# Patient Record
Sex: FEMALE | Race: WHITE | URBAN - NONMETROPOLITAN AREA
[De-identification: names, ages, dates, MRNs, and addresses within clinical notes are randomized per-mention and may not be internally consistent; named-entity substitution may affect disease eponyms.]

---

## 2017-01-27 ENCOUNTER — APPOINTMENT (RX ONLY)
Dept: URBAN - NONMETROPOLITAN AREA CLINIC 1 | Facility: CLINIC | Age: 67
Setting detail: DERMATOLOGY
End: 2017-01-27

## 2017-01-27 DIAGNOSIS — Z41.9 ENCOUNTER FOR PROCEDURE FOR PURPOSES OTHER THAN REMEDYING HEALTH STATE, UNSPECIFIED: ICD-10-CM

## 2017-01-27 DIAGNOSIS — L81.4 OTHER MELANIN HYPERPIGMENTATION: ICD-10-CM

## 2017-01-27 PROCEDURE — ? FILLERS

## 2017-01-27 PROCEDURE — ? BOTOX

## 2017-01-27 PROCEDURE — ? BENIGN DESTRUCTION COSMETIC MULTI

## 2017-01-27 ASSESSMENT — LOCATION SIMPLE DESCRIPTION DERM: LOCATION SIMPLE: LEFT HAND

## 2017-01-27 ASSESSMENT — LOCATION DETAILED DESCRIPTION DERM: LOCATION DETAILED: LEFT RADIAL DORSAL HAND

## 2017-01-27 ASSESSMENT — LOCATION ZONE DERM: LOCATION ZONE: HAND

## 2017-01-27 NOTE — PROCEDURE: BENIGN DESTRUCTION COSMETIC MULTI
Post-Care Instructions: I reviewed with the patient in detail post-care instructions. Patient is to wear sunprotection, and avoid picking at any of the treated lesions. Pt may apply Vaseline to crusted or scabbing areas.
Anesthesia Volume In Cc: 0
Detail Level: Simple
Total Number Of Lesions Treated: 5
Consent: The patient's verbal consent was obtained including but not limited to risks of crusting, scabbing, blistering, scarring, darker or lighter pigmentary change, recurrence, incomplete removal and infection.  Pre-treatment area cleansed with alcohol and hibiclens.

## 2017-01-27 NOTE — PROCEDURE: FILLERS
Marionette Lines Filler Volume In Cc: 0
Additional Area 2 Location: oral comissures
Additional Anesthesia Volume In Cc: 6
Price (Use Numbers Only, No Special Characters Or $): 1100
Expiration Date (Month Year): 10/31/2017
Additional Area 1 Location: Perioral rhytids
Filler: Juvederm Ultra XC
Lot #: [id number]
Post-Care Instructions: Patient instructed to apply ice to reduce swelling.
Additional Area 4 Location: Oral commissures
Topical Anesthesia?: 5% lidocaine
Map Statment: See Attach Map for Complete Details
Additional Area 2 Location: cheeks smile lines
Lot #: [id number]
Expiration Date (Month Year): 03/21/2018
Additional Area 1 Volume In Cc: 0.5
Additional Area 3 Location: Chin
Consent: Written consent obtained. Risks include but not limited to bruising, beading, irregular texture, ulceration, infection, allergic reaction, scar formation, incomplete augmentation, temporary nature, procedural pain.
Use Map Statement For Sites (Optional): No
Additional Area 1 Volume In Cc: 1
Additional Area 3 Location: Glabella
Detail Level: Zone
Additional Area 5 Location: L upper lip rhytid

## 2017-01-27 NOTE — PROCEDURE: BOTOX
Levator Labii Superioris Units: 0
Consent: Written consent obtained. Risks include but not limited to lid/brow ptosis, bruising, swelling, diplopia, temporary effect, incomplete chemical denervation.
Dilution (U/0.1 Cc): 3
Glabellar Complex Units: 9
Additional Area 4 Location: Gummy smile
Expiration Date (Month Year): 8/2019
Additional Area 3 Location: Infraorbital
Additional Area 2 Location: Bunny lines
Additional Area 6 Location: arch of eyebrows
Additional Area 5 Location: Upper lip
Detail Level: Zone
Post-Care Instructions: Patient instructed to not lie down for 4 hours and limit physical activity for 24 hours.
Price (Use Numbers Only, No Special Characters Or $): 361
Lot #: [id number]
Additional Area 1 Location: Crows feet

## 2017-04-13 ENCOUNTER — OFFICE VISIT (OUTPATIENT)
Dept: RHEUMATOLOGY | Facility: PHYSICIAN GROUP | Age: 67
End: 2017-04-13
Payer: MEDICARE

## 2017-04-13 VITALS
DIASTOLIC BLOOD PRESSURE: 78 MMHG | HEART RATE: 90 BPM | BODY MASS INDEX: 18.58 KG/M2 | RESPIRATION RATE: 12 BRPM | HEIGHT: 62 IN | WEIGHT: 101 LBS | TEMPERATURE: 98.6 F | SYSTOLIC BLOOD PRESSURE: 112 MMHG | OXYGEN SATURATION: 98 %

## 2017-04-13 DIAGNOSIS — M19.90 OSTEOARTHRITIS, UNSPECIFIED OSTEOARTHRITIS TYPE, UNSPECIFIED SITE: ICD-10-CM

## 2017-04-13 DIAGNOSIS — I73.00 RAYNAUD'S DISEASE WITHOUT GANGRENE: ICD-10-CM

## 2017-04-13 PROCEDURE — 3017F COLORECTAL CA SCREEN DOC REV: CPT | Mod: 8P | Performed by: INTERNAL MEDICINE

## 2017-04-13 PROCEDURE — 4004F PT TOBACCO SCREEN RCVD TLK: CPT | Mod: 8P | Performed by: INTERNAL MEDICINE

## 2017-04-13 PROCEDURE — G8419 CALC BMI OUT NRM PARAM NOF/U: HCPCS | Performed by: INTERNAL MEDICINE

## 2017-04-13 PROCEDURE — 1101F PT FALLS ASSESS-DOCD LE1/YR: CPT | Mod: 8P | Performed by: INTERNAL MEDICINE

## 2017-04-13 PROCEDURE — 4040F PNEUMOC VAC/ADMIN/RCVD: CPT | Mod: 8P | Performed by: INTERNAL MEDICINE

## 2017-04-13 PROCEDURE — G8432 DEP SCR NOT DOC, RNG: HCPCS | Performed by: INTERNAL MEDICINE

## 2017-04-13 PROCEDURE — 3014F SCREEN MAMMO DOC REV: CPT | Mod: 8P | Performed by: INTERNAL MEDICINE

## 2017-04-13 PROCEDURE — 99204 OFFICE O/P NEW MOD 45 MIN: CPT | Performed by: INTERNAL MEDICINE

## 2017-04-13 RX ORDER — TRAZODONE HYDROCHLORIDE 100 MG/1
100 TABLET ORAL NIGHTLY
COMMUNITY

## 2017-04-13 RX ORDER — IBUPROFEN 200 MG
200 TABLET ORAL EVERY 6 HOURS PRN
COMMUNITY
End: 2020-05-22

## 2017-04-13 RX ORDER — METHYLPHENIDATE HYDROCHLORIDE 20 MG/1
20 TABLET ORAL 3 TIMES DAILY
COMMUNITY

## 2017-04-13 RX ORDER — FLUOXETINE HYDROCHLORIDE 40 MG/1
40 CAPSULE ORAL EVERY MORNING
COMMUNITY

## 2017-04-13 RX ORDER — ALPRAZOLAM 0.5 MG/1
0.5 TABLET, EXTENDED RELEASE ORAL
Status: ON HOLD | COMMUNITY
End: 2020-05-26

## 2017-04-13 RX ORDER — LURASIDONE HYDROCHLORIDE 60 MG/1
1 TABLET, FILM COATED ORAL EVERY MORNING
COMMUNITY

## 2017-04-13 NOTE — MR AVS SNAPSHOT
"        Angie Dover   2017 7:30 AM   Office Visit   MRN: [id number]    Department:  Rheumatology [facility]   Dept Phone:  [phone]    Description:  Female : 1950   Provider:  Carmen Rodriguez M.D.           Reason for Visit     New Patient new patient       Allergies as of 2017     Allergen Noted Reactions    Morphine 2017   Unspecified    Pt. States it gives her Migraine's     Penicillins 2017   Swelling    Pt. States swelling of the mouth     Sulfa Drugs 2017   Swelling    Pt. States mouth swelling       You were diagnosed with     Raynaud's disease without gangrene   [[id number]]       Osteoarthritis, unspecified osteoarthritis type, unspecified site   [[id number]]         Vital Signs     Blood Pressure Pulse Temperature Respirations Height Weight    112/78 mmHg 90 37 °C (98.6 °F) 12 1.575 m (5' 2.01\") 45.813 kg (101 lb)    Body Mass Index Oxygen Saturation Breastfeeding?             18.47 kg/m2 98% No         Basic Information     Date Of Birth Sex Race Ethnicity Preferred Language    1950 Female Unable to Obtain Unknown English      Your appointments     2017  2:00 PM   Follow Up Visit with Carmen Rodriguez M.D.   [facility] (--)    [address] [place] [address], [address]  [facility] [address] [phone]           You will be receiving a confirmation call a few days before your appointment from our automated call confirmation system.              Health Maintenance     Patient has no pending health maintenance at this time      Current Immunizations     No immunizations on file.      Below and/or attached are the medications your provider expects you to take. Review all of your home medications and newly ordered medications with your provider and/or pharmacist. Follow medication instructions as directed by your provider and/or pharmacist. Please keep your medication list with you and share with your provider. Update the information when medications are discontinued, doses " are changed, or new medications (including over-the-counter products) are added; and carry medication information at all times in the event of emergency situations     Allergies:  MORPHINE - Unspecified     PENICILLINS - Swelling     SULFA DRUGS - Swelling               Medications  Valid as of: April 13, 2017 -  8:24 AM    Generic Name Brand Name Tablet Size Instructions for use    ALPRAZolam (TABLET SR 24 HR) XANAX XR 0.5 MG Take 0.5 mg by mouth every morning.        Cholecalciferol (Cap) vitamin D3 5000 UNITS Take 1 Cap by mouth every day.        FLUoxetine HCl (Cap) PROZAC 40 MG Take 40 mg by mouth every day.        Folic Acid   Take  by mouth.        Ibuprofen (Tab) MOTRIN 200 MG Take 200 mg by mouth every 6 hours as needed.        Lurasidone HCl (Tab) Lurasidone HCl 60 MG Take  by mouth.        Methylphenidate HCl (Tab) RITALIN 20 MG Take 20 mg by mouth 2 times a day.        TraZODone HCl (Tab) DESYREL 100 MG Take 100 mg by mouth every evening.        .                 Medicines prescribed today were sent to:     None      Medication refill instructions:       If your prescription bottle indicates you have medication refills left, it is not necessary to call your provider’s office. Please contact your pharmacy and they will refill your medication.    If your prescription bottle indicates you do not have any refills left, you may request refills at any time through one of the following ways: The online [employer] system (except Urgent Care), by calling your provider’s office, or by asking your pharmacy to contact your provider’s office with a refill request. Medication refills are processed only during regular business hours and may not be available until the next business day. Your provider may request additional information or to have a follow-up visit with you prior to refilling your medication.   *Please Note: Medication refills are assigned a new Rx number when refilled electronically. Your pharmacy may  indicate that no refills were authorized even though a new prescription for the same medication is available at the pharmacy. Please request the medicine by name with the pharmacy before contacting your provider for a refill.        Your To Do List     Future Labs/Procedures Complete By Expires    JORDON TITER  As directed 4/13/2018    COMP METABOLIC PANEL  As directed 4/13/2018    DX-JOINT SURVEY-FEET SINGLE VIEW  As directed 4/13/2018    DX-JOINT SURVEY-HANDS SINGLE VIEW  As directed 4/13/2018    HEP B CORE AB TOTAL  As directed 4/13/2018         Cloudkick Access Code: [email or web address]  Expires: 5/13/2017  8:24 AM    Cloudkick  A secure, online tool to manage your health information     [employer]’s Cloudkick® is a secure, online tool that connects you to your personalized health information from the privacy of your home -- day or night - making it very easy for you to manage your healthcare. Once the activation process is completed, you can even access your medical information using the Cloudkick je, which is available for free in the Apple Je store or Google Play store.     Cloudkick provides the following levels of access (as shown below):   My Chart Features   [facility] Doctor [facility]  Urgent  Care Non-[facility]  Primary Care  Doctor   Email your healthcare team securely and privately 24/7 X X X    Manage appointments: schedule your next appointment; view details of past/upcoming appointments X      Request prescription refills. X      View recent personal medical records, including lab and immunizations X X X X   View health record, including health history, allergies, medications X X X X   Read reports about your outpatient visits, procedures, consult and ER notes X X X X   See your discharge summary, which is a recap of your hospital and/or ER visit that includes your diagnosis, lab results, and care plan. X X       How to register for Cloudkick:  1. Go to   [email or web address].  2. Click on the Sign Up Now box, which takes you to the New Member Sign Up page. You will need to provide the following information:  a. Enter your [employer] Access Code exactly as it appears at the top of this page. (You will not need to use this code after you’ve completed the sign-up process. If you do not sign up before the expiration date, you must request a new code.)   b. Enter your date of birth.   c. Enter your home email address.   d. Click Submit, and follow the next screen’s instructions.  3. Create a [employer] ID. This will be your [employer] login ID and cannot be changed, so think of one that is secure and easy to remember.  4. Create a zoomsquaret password. You can change your password at any time.  5. Enter your Password Reset Question and Answer. This can be used at a later time if you forget your password.   6. Enter your e-mail address. This allows you to receive e-mail notifications when new information is available in [employer].  7. Click Sign Up. You can now view your health information.    For assistance activating your [employer] account, call [phone]
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
## 2017-06-29 ENCOUNTER — APPOINTMENT (OUTPATIENT)
Dept: RHEUMATOLOGY | Facility: PHYSICIAN GROUP | Age: 67
End: 2017-06-29
Payer: MEDICARE

## 2017-07-05 ENCOUNTER — APPOINTMENT (RX ONLY)
Dept: URBAN - NONMETROPOLITAN AREA CLINIC 1 | Facility: CLINIC | Age: 67
Setting detail: DERMATOLOGY
End: 2017-07-05

## 2017-07-05 DIAGNOSIS — Z48.02 ENCOUNTER FOR REMOVAL OF SUTURES: ICD-10-CM

## 2017-07-05 DIAGNOSIS — Z41.9 ENCOUNTER FOR PROCEDURE FOR PURPOSES OTHER THAN REMEDYING HEALTH STATE, UNSPECIFIED: ICD-10-CM

## 2017-07-05 PROCEDURE — ? BOTOX

## 2017-07-05 PROCEDURE — ? OTHER

## 2017-07-05 NOTE — PROCEDURE: BOTOX
Additional Area 6 Units: 0
Additional Area 1 Location: Superior lateral obituaries oculi
Price (Use Numbers Only, No Special Characters Or $): 450
Additional Area 2 Location: infraorbital
Consent: Written consent obtained. Risks include but not limited to lid/brow ptosis, bruising, swelling, diplopia, temporary effect, incomplete chemical denervation.
Detail Level: Detailed
Forehead Units: 9
Dilution (U/0.1 Cc): 4
Additional Area 3 Location: bunny lines
Periorbital Skin Units: 12
Post-Care Instructions: Patient instructed to not lie down for 4 hours and limit physical activity for 24 hours. Patient instructed not to travel by airplane for 48 hours.
Expiration Date (Month Year): 01/2020
Additional Area 4 Location: L lateral eyebrow
Lot #: [id number]

## 2017-07-05 NOTE — PROCEDURE: OTHER
Note Text (......Xxx Chief Complaint.): This diagnosis correlates with the
Detail Level: Simple
Other (Free Text): Cleansed area behind L ear trimmed visible suture.  Patient to follow up with plastic surgeon s/p face lift.  Suture is deeply placed unable to completely remove. Site healed no s/sx infection.

## 2017-09-27 ENCOUNTER — APPOINTMENT (RX ONLY)
Dept: URBAN - NONMETROPOLITAN AREA CLINIC 1 | Facility: CLINIC | Age: 67
Setting detail: DERMATOLOGY
End: 2017-09-27

## 2017-09-27 DIAGNOSIS — L82.1 OTHER SEBORRHEIC KERATOSIS: ICD-10-CM

## 2017-09-27 DIAGNOSIS — L81.4 OTHER MELANIN HYPERPIGMENTATION: ICD-10-CM

## 2017-09-27 DIAGNOSIS — D22 MELANOCYTIC NEVI: ICD-10-CM

## 2017-09-27 PROBLEM — D22.5 MELANOCYTIC NEVI OF TRUNK: Status: ACTIVE | Noted: 2017-09-27

## 2017-09-27 PROCEDURE — ? COUNSELING

## 2017-09-27 PROCEDURE — 99214 OFFICE O/P EST MOD 30 MIN: CPT

## 2017-09-27 ASSESSMENT — LOCATION SIMPLE DESCRIPTION DERM
LOCATION SIMPLE: RIGHT UPPER BACK
LOCATION SIMPLE: LEFT UPPER BACK
LOCATION SIMPLE: LOWER BACK

## 2017-09-27 ASSESSMENT — LOCATION DETAILED DESCRIPTION DERM
LOCATION DETAILED: SUPERIOR LUMBAR SPINE
LOCATION DETAILED: LEFT SUPERIOR MEDIAL UPPER BACK
LOCATION DETAILED: RIGHT MEDIAL UPPER BACK

## 2017-09-27 ASSESSMENT — LOCATION ZONE DERM: LOCATION ZONE: TRUNK

## 2018-02-28 ENCOUNTER — APPOINTMENT (RX ONLY)
Dept: URBAN - NONMETROPOLITAN AREA CLINIC 1 | Facility: CLINIC | Age: 68
Setting detail: DERMATOLOGY
End: 2018-02-28

## 2018-02-28 DIAGNOSIS — Z41.9 ENCOUNTER FOR PROCEDURE FOR PURPOSES OTHER THAN REMEDYING HEALTH STATE, UNSPECIFIED: ICD-10-CM

## 2018-02-28 PROCEDURE — ? BOTOX

## 2018-02-28 NOTE — PROCEDURE: BOTOX
Anterior Platysmal Bands Units: 0
Post-Care Instructions: Patient instructed to not lie down for 4 hours and limit physical activity for 24 hours.
Lot #: [id number]
Expiration Date (Month Year): 8/2020
Additional Area 4 Location: L lateral eyebrow
Additional Area 1 Location: Superior lateral obituaries oculi
Forehead Units: 9
Price (Use Numbers Only, No Special Characters Or $): 466
Additional Area 3 Location: bunny lines
Additional Area 2 Location: infraorbital
Detail Level: Detailed
Glabellar Complex Units: 12
Consent: Written consent obtained. Risks include but not limited to lid/brow ptosis, bruising, swelling, diplopia, temporary effect, incomplete chemical denervation.
Dilution (U/0.1 Cc): 4

## 2018-05-22 ENCOUNTER — APPOINTMENT (RX ONLY)
Dept: URBAN - NONMETROPOLITAN AREA CLINIC 1 | Facility: CLINIC | Age: 68
Setting detail: DERMATOLOGY
End: 2018-05-22

## 2018-05-22 DIAGNOSIS — Z41.9 ENCOUNTER FOR PROCEDURE FOR PURPOSES OTHER THAN REMEDYING HEALTH STATE, UNSPECIFIED: ICD-10-CM

## 2018-05-22 PROCEDURE — ? BOTOX

## 2018-05-22 NOTE — PROCEDURE: BOTOX
Expiration Date (Month Year): 10/20
Periorbital Skin Units: 0
Detail Level: Detailed
Lot #: [id number]
Consent: Written consent obtained. Risks include but not limited to lid/brow ptosis, bruising, swelling, diplopia, temporary effect, incomplete chemical denervation.
Glabellar Complex Units: 12
Price (Use Numbers Only, No Special Characters Or $): 794
Forehead Units: 9
Inferior Lateral Orbicularis Oculi Units: 4
Post-Care Instructions: Patient instructed to not lie down for 4 hours and limit physical activity for 24 hours. Patient instructed not to travel by airplane for 48 hours.

## 2018-08-21 ENCOUNTER — APPOINTMENT (RX ONLY)
Dept: URBAN - NONMETROPOLITAN AREA CLINIC 1 | Facility: CLINIC | Age: 68
Setting detail: DERMATOLOGY
End: 2018-08-21

## 2018-08-21 DIAGNOSIS — Z41.9 ENCOUNTER FOR PROCEDURE FOR PURPOSES OTHER THAN REMEDYING HEALTH STATE, UNSPECIFIED: ICD-10-CM

## 2018-08-21 PROCEDURE — ? BOTOX

## 2018-08-21 NOTE — PROCEDURE: BOTOX
Detail Level: Detailed
Anterior Platysmal Bands Units: 0
Lot #: [id number]
Consent: Written consent obtained. Risks include but not limited to lid/brow ptosis, bruising, swelling, diplopia, temporary effect, incomplete chemical denervation.
Dilution (U/0.1 Cc): 4
Price (Use Numbers Only, No Special Characters Or $): 450
Glabellar Complex Units: 12
Post-Care Instructions: Patient instructed to not lie down for 4 hours and limit physical activity for 24 hours. Patient instructed not to travel by airplane for 48 hours.
Expiration Date (Month Year): 12/2020
Forehead Units: 9

## 2018-08-31 ENCOUNTER — APPOINTMENT (RX ONLY)
Dept: URBAN - NONMETROPOLITAN AREA CLINIC 1 | Facility: CLINIC | Age: 68
Setting detail: DERMATOLOGY
End: 2018-08-31

## 2018-08-31 DIAGNOSIS — D22 MELANOCYTIC NEVI: ICD-10-CM

## 2018-08-31 DIAGNOSIS — L82.0 INFLAMED SEBORRHEIC KERATOSIS: ICD-10-CM

## 2018-08-31 DIAGNOSIS — Z41.9 ENCOUNTER FOR PROCEDURE FOR PURPOSES OTHER THAN REMEDYING HEALTH STATE, UNSPECIFIED: ICD-10-CM

## 2018-08-31 PROBLEM — D22.5 MELANOCYTIC NEVI OF TRUNK: Status: ACTIVE | Noted: 2018-08-31

## 2018-08-31 PROCEDURE — 99213 OFFICE O/P EST LOW 20 MIN: CPT | Mod: 25

## 2018-08-31 PROCEDURE — ? LIQUID NITROGEN

## 2018-08-31 PROCEDURE — ? FILLERS

## 2018-08-31 PROCEDURE — ? COUNSELING

## 2018-08-31 PROCEDURE — 17111 DESTRUCTION B9 LESIONS 15/>: CPT

## 2018-08-31 PROCEDURE — ? BOTOX

## 2018-08-31 ASSESSMENT — LOCATION DETAILED DESCRIPTION DERM
LOCATION DETAILED: RIGHT MID TEMPLE
LOCATION DETAILED: LEFT DISTAL DORSAL FOREARM
LOCATION DETAILED: LEFT DISTAL PRETIBIAL REGION
LOCATION DETAILED: RIGHT ULNAR DORSAL HAND
LOCATION DETAILED: EPIGASTRIC SKIN
LOCATION DETAILED: RIGHT INFERIOR MEDIAL UPPER BACK
LOCATION DETAILED: RIGHT RADIAL DORSAL HAND
LOCATION DETAILED: RIGHT PROXIMAL DORSAL THUMB
LOCATION DETAILED: SUPERIOR MID FOREHEAD
LOCATION DETAILED: LEFT ANTERIOR PROXIMAL UPPER ARM
LOCATION DETAILED: RIGHT ANTERIOR SHOULDER
LOCATION DETAILED: LEFT PROXIMAL PRETIBIAL REGION
LOCATION DETAILED: RIGHT ANTERIOR DISTAL THIGH
LOCATION DETAILED: RIGHT INFERIOR LATERAL MALAR CHEEK
LOCATION DETAILED: RIGHT PROXIMAL PRETIBIAL REGION
LOCATION DETAILED: RIGHT LATERAL PROXIMAL PRETIBIAL REGION
LOCATION DETAILED: LEFT RADIAL DORSAL HAND
LOCATION DETAILED: RIGHT CENTRAL EYEBROW
LOCATION DETAILED: RIGHT PROXIMAL DORSAL FOREARM
LOCATION DETAILED: RIGHT MEDIAL PROXIMAL PRETIBIAL REGION
LOCATION DETAILED: RIGHT DISTAL DORSAL FOREARM
LOCATION DETAILED: LEFT DISTAL RADIAL DORSAL FOREARM

## 2018-08-31 ASSESSMENT — LOCATION ZONE DERM
LOCATION ZONE: TRUNK
LOCATION ZONE: ARM
LOCATION ZONE: FACE
LOCATION ZONE: HAND
LOCATION ZONE: FINGER
LOCATION ZONE: LEG

## 2018-08-31 ASSESSMENT — LOCATION SIMPLE DESCRIPTION DERM
LOCATION SIMPLE: RIGHT EYEBROW
LOCATION SIMPLE: LEFT PRETIBIAL REGION
LOCATION SIMPLE: ABDOMEN
LOCATION SIMPLE: RIGHT CHEEK
LOCATION SIMPLE: RIGHT PRETIBIAL REGION
LOCATION SIMPLE: LEFT UPPER ARM
LOCATION SIMPLE: RIGHT THUMB
LOCATION SIMPLE: RIGHT HAND
LOCATION SIMPLE: LEFT HAND
LOCATION SIMPLE: RIGHT FOREARM
LOCATION SIMPLE: RIGHT THIGH
LOCATION SIMPLE: RIGHT SHOULDER
LOCATION SIMPLE: RIGHT UPPER BACK
LOCATION SIMPLE: RIGHT TEMPLE
LOCATION SIMPLE: SUPERIOR FOREHEAD
LOCATION SIMPLE: LEFT FOREARM

## 2018-08-31 NOTE — PROCEDURE: FILLERS
Include Cannula Information In Note?: No
Tear Troughs Filler Volume In Cc: 0
Additional Area 1 Location: perioral rhytids
Use Map Statement For Sites (Optional): Yes
Lot #: [id number]
Expiration Date (Month Year): 5/6/2019
Topical Anesthesia?: 23% lidocaine, 7% tetracaine
Marionette Lines Filler Volume In Cc: 1
Detail Level: Simple
Additional Anesthesia Volume In Cc: 6
Filler: Juvederm Ultra XC
Map Statment: See Attach Map for Complete Details
Price (Use Numbers Only, No Special Characters Or $): 417
Expiration Date (Month Year): 11/30/2018
Consent: Written consent obtained. Risks include but not limited to bruising, beading, irregular texture, ulceration, infection, allergic reaction, scar formation, incomplete augmentation, temporary nature, procedural pain.
Lot #: [id number]
Post-Care Instructions: Patient instructed to apply ice to reduce swelling.

## 2018-08-31 NOTE — PROCEDURE: BOTOX
Levator Labii Superioris Units: 0
Additional Area 4 Location: Eyebrow arch
Expiration Date (Month Year): 01/2021
Detail Level: Zone
Dilution (U/0.1 Cc): 4
Price (Use Numbers Only, No Special Characters Or $): 180
Additional Area 2 Location: Bunny lines
Consent: Written consent obtained. Risks include but not limited to lid/brow ptosis, bruising, swelling, diplopia, temporary effect, incomplete chemical denervation.
Additional Area 3 Location: Infraorbital
Additional Area 6 Location: Total units
Periorbital Skin Units: 12
Post-Care Instructions: Patient instructed to not lie down for 4 hours and limit physical activity for 24 hours.
Additional Area 1 Location: Crows feet
Lot #: [id number]

## 2018-08-31 NOTE — PROCEDURE: LIQUID NITROGEN
Number Of Freeze-Thaw Cycles: 2 freeze-thaw cycles
Medical Necessity Clause: This procedure was medically necessary because the lesions that were treated were:
Include Z78.9 (Other Specified Conditions Influencing Health Status) As An Associated Diagnosis?: Yes
Detail Level: Simple
Add 52 Modifier (Optional): no
Post-Care Instructions: I reviewed with the patient in detail post-care instructions. Patient is to wear sunprotection, and avoid picking at any of the treated lesions. Pt may apply Vaseline to crusted or scabbing areas.
Consent: The patient's consent was obtained including but not limited to risks of crusting, scabbing, blistering, scarring, darker or lighter pigmentary change, recurrence, incomplete removal and infection.
Medical Necessity Information: It is in your best interest to select a reason for this procedure from the list below. All of these items fulfill various CMS LCD requirements except the new and changing color options.

## 2018-10-08 ENCOUNTER — TELEPHONE (OUTPATIENT)
Dept: RHEUMATOLOGY | Facility: MEDICAL CENTER | Age: 68
End: 2018-10-08

## 2018-10-08 NOTE — TELEPHONE ENCOUNTER
Pt only saw you once on 4/13/17 for a NP appt.and hasn't been back since. Pt is seeing Neuro Surgeon this Wed 10/10 for 2nd opinion for possible surgery.  They also recommend that she get back in with her Rheumatologist. I explained to her that you are all booked up and that Friday is your last day.  Should I schedule pt with Dr. POLK or wait to see Dr. Couch in Jan. ?

## 2018-10-08 NOTE — TELEPHONE ENCOUNTER
I told pt she needs to go ahead and get the XRays that you ordered and then scheduled pt with Dr. POLK for 10/22/18.

## 2018-10-09 ENCOUNTER — TELEPHONE (OUTPATIENT)
Dept: RHEUMATOLOGY | Facility: MEDICAL CENTER | Age: 68
End: 2018-10-09

## 2018-10-09 DIAGNOSIS — R76.8 RHEUMATOID FACTOR POSITIVE: ICD-10-CM

## 2018-10-09 NOTE — TELEPHONE ENCOUNTER
Pt went to [facility] today to do the X-Rays of Hands & Feet, but the order is . She need you to rewrite & fax new orders to them at   FAX # [phone] ASA. Pt will go back tomorrow to get them done. Please notify pt when orders have been faxed.

## 2018-10-11 NOTE — TELEPHONE ENCOUNTER
I have extended the expiration date, faxed the orders and LM to advise patient. Message closed. NK

## 2018-10-22 ENCOUNTER — OFFICE VISIT (OUTPATIENT)
Dept: RHEUMATOLOGY | Facility: MEDICAL CENTER | Age: 68
End: 2018-10-22
Payer: MEDICARE

## 2018-10-22 VITALS
TEMPERATURE: 98.8 F | HEART RATE: 90 BPM | BODY MASS INDEX: 18.65 KG/M2 | SYSTOLIC BLOOD PRESSURE: 130 MMHG | WEIGHT: 102 LBS | RESPIRATION RATE: 16 BRPM | DIASTOLIC BLOOD PRESSURE: 80 MMHG | OXYGEN SATURATION: 100 %

## 2018-10-22 DIAGNOSIS — M54.5 CHRONIC MIDLINE LOW BACK PAIN, WITH SCIATICA PRESENCE UNSPECIFIED: ICD-10-CM

## 2018-10-22 DIAGNOSIS — M79.674 PAIN OF TOE OF RIGHT FOOT: ICD-10-CM

## 2018-10-22 DIAGNOSIS — M81.0 SENILE OSTEOPOROSIS: ICD-10-CM

## 2018-10-22 DIAGNOSIS — G89.29 CHRONIC MIDLINE LOW BACK PAIN, WITH SCIATICA PRESENCE UNSPECIFIED: ICD-10-CM

## 2018-10-22 PROCEDURE — 99213 OFFICE O/P EST LOW 20 MIN: CPT | Performed by: INTERNAL MEDICINE

## 2018-10-22 RX ORDER — CYCLOBENZAPRINE HCL 10 MG
10 TABLET ORAL 3 TIMES DAILY PRN
COMMUNITY
End: 2020-05-22

## 2018-10-22 NOTE — PROGRESS NOTES
Chief Complaint- joint pain    Subjective:   Angie Dover is a 68 y.o. female here today for follow up of rheumatological issues    This is a new patient to me previously followed by Dr. Rodriguez who is no longer with the clinic initially seen in April 2017 over a year and a half ago referred for evaluation of joint pain, patient states that her predominant issue is low back pain for which she sees Dr. Barr neurosurgeon who patient is scheduled for repeat MRI of her low back this week for evaluation of efficacy of possible surgery.  Patient had been referred to rheumatology for evaluation of other arthritis.  Patient is status post hand and feet x-rays which show only osteoarthritis, patient serologies are negative. Of note ESR=5 9/2018    Other comorbidities include osteoporosis seen on bone density scan January 2018 at [facility].  By patient's description it sounds like patient may have been tried on a bisphosphonate which caused all over body pain/bone pain and also a calcitonin nasal spray which patient did not like.    S/p calcitonin-patient didn't like  S/p bisphosphonate-bone pain        CCP neg 9/2018  JORDON 1:40 9/2018  MRI LS spine 5/2018-indicates large extruded disc with possible retropulsed old healing posterior superior T9 fracture versus large exuberant osteophyte with results of severe central canal stenosis at T8/9, 2 focal extruded disc at T10-11, additional multilevel degenerative changes in the lumbar spine-[facility]  DEXA 1/3/2018 T scores -1.5, -2.4, -2.4, -2.7, -2.6-[facility]  Hand x-rays 10/2018-indicates osteoarthritis/no evidence of erosive arthritis-[facility]  Feet x-rays 10/2018-indicates osteoarthritis/no evidence of erosive arthritis-[facility]    Current medicines (including changes today)  Current Outpatient Prescriptions   Medication Sig Dispense Refill   • cyclobenzaprine (FLEXERIL) 10 MG Tab Take 10 mg by mouth 3 times  a day as needed.     • fluoxetine (PROZAC) 40 MG capsule Take 40 mg by mouth every day.     • trazodone (DESYREL) 100 MG Tab Take 100 mg by mouth every evening.     • alprazolam (XANAX XR) 0.5 MG XR tablet Take 0.5 mg by mouth every morning.     • Lurasidone HCl (LATUDA) 60 MG Tab Take  by mouth.     • methylphenidate (RITALIN) 20 MG tablet Take 20 mg by mouth 2 times a day.     • Cholecalciferol (VITAMIN D3) 5000 UNITS Cap Take 1 Cap by mouth every day.     • FOLIC ACID PO Take  by mouth.     • ibuprofen (ADVIL) 200 MG Tab Take 200 mg by mouth every 6 hours as needed.       No current facility-administered medications for this visit.      She  has no past medical history on file.    ROS   Other than what is mentioned in HPI or physical exam, there is no history of headaches, double vision or blurred vision. No temporal tenderness or jaw claudication. No history of cataracts or glaucoma. No trouble swallowing difficulties or sore throats.  No chest complaints including chest pain, cough or sputum production. No GI complaints including nausea, vomiting, change in bowel habits, or past peptic ulcer disease. No history of blood in the stools. No urinary complaints including dysuria or frequency. No history of alopecia, photosensitivity, oral ulcerations, Raynaud's phenomena.       Objective:     Blood pressure 130/80, pulse 90, temperature 37.1 °C (98.8 °F), temperature source Temporal, resp. rate 16, weight 46.3 kg (102 lb), SpO2 100 %, not currently breastfeeding. Body mass index is 18.65 kg/m².   Physical Exam:    Constitutional: Alert and oriented X3, patient is talkative with good eye contact.Skin: Warm, dry, good turgor, no rashes in visible areas, no psoriatic lesions, no petechial lesions, no malar rashes  .Eye: Equal, round and reactive, conjunctiva clear, lids normal EOM intactENMT: Lips without lesions, good dentition, no oropharyngeal ulcers, moist buccal mucosa, pinna without deformityNeck: Trachea  midline, no masses, no thyromegaly.Lymph:  No cervical lymphadenopathy, no axillary lymphadenopathy, no supraclavicular lymphadenopathyRespiratory: Unlabored respiratory effort, lungs clear to auscultation, no wheezes, no ronchi.Cardiovascular: Normal S1, S2, no murmur, no edema.Abdomen: Soft, non-tender, no masses, no hepatosplenomegaly.Psych: Alert and oriented x3, normal affect and mood.Neuro: Cranial nerves 2-12 are grossly intact, no loss of sensation LEExt:no joint laxity noted in bilateral arms, no joint laxity noted in bilateral legs, no deformities on hands or toes, no flexion contractures no nodules no tophi shoulders full range of motion including internal and external rotation, patient is splinting her lower back specifically more on the right side gait without antalgia and without foot drop     Assessment and Plan:     1. Chronic midline low back pain, with sciatica presence unspecified  Secondary to disc DDD and DJD status post evaluation by neurosurgery Dr. Barr who did an epidural with excellent results for at least 30 days now scheduled to get a repeat MRI this week to see about any surgical intervention  Patient already sees pain management up in [place], [place]    2. Senile osteoporosis  Seen by bone density scan September 2018 followed by patient's primary care doctor Valeria Zuñiga in [facility], status post trial of bisphosphonates and calcitonin without benefit may want to consider the use of Prolia or Forteo    3. Pain of toe of right foot  Status post evaluation by podiatry he states patient's bunion on the right great toe is pushing against the second toe which is causing the pain recommending surgery for which patient is considering    Patient without any current rheumatological issues, will have patient follow-up on an as-needed basis.  Followup: Return if symptoms worsen or fail to improve. or sooner vickie Dover was seen 30 minutes face-to-face of which more than  50% of the time was spent counseling the patient (excluding time for procedures)  regarding  rheumatological condition and care. Therapy was discussed in detail.    Please note that this dictation was created using voice recognition software. I have made every reasonable attempt to correct obvious errors, but I expect that there are errors of grammar and possibly content that I did not discover before finalizing the note.

## 2018-10-22 NOTE — LETTER
[facility]   [address] [place] [address], [address]  [place] [address]  Phone: [phone]  Fax: [phone]              Encounter Date: 10/22/2018    Dear Dr. Honeycutt ref. provider found,    It was a pleasure seeing your patient, Angie Dover, on 10/22/2018. Diagnoses of Chronic midline low back pain, with sciatica presence unspecified, Senile osteoporosis, and Pain of toe of right foot were pertinent to this visit.     Please find attached progress note which includes the history I obtained from Ms. Dover, my physical examination findings, my impression and recommendations.      Once again, it was a pleasure participating in your patient's care.  Please feel free to contact me if you have any questions or if I can be of any further assistance to your patients.      Sincerely,    Luisa Nino M.D.  Electronically Signed          PROGRESS NOTE:  No notes on file

## 2018-12-17 ENCOUNTER — APPOINTMENT (RX ONLY)
Dept: URBAN - NONMETROPOLITAN AREA CLINIC 1 | Facility: CLINIC | Age: 68
Setting detail: DERMATOLOGY
End: 2018-12-17

## 2018-12-17 DIAGNOSIS — Z41.9 ENCOUNTER FOR PROCEDURE FOR PURPOSES OTHER THAN REMEDYING HEALTH STATE, UNSPECIFIED: ICD-10-CM

## 2018-12-17 PROCEDURE — ? BOTOX

## 2018-12-17 ASSESSMENT — LOCATION SIMPLE DESCRIPTION DERM
LOCATION SIMPLE: LEFT TEMPLE
LOCATION SIMPLE: RIGHT FOREHEAD
LOCATION SIMPLE: RIGHT CHEEK
LOCATION SIMPLE: RIGHT TEMPLE
LOCATION SIMPLE: LEFT FOREHEAD
LOCATION SIMPLE: LEFT CHEEK

## 2018-12-17 ASSESSMENT — LOCATION DETAILED DESCRIPTION DERM
LOCATION DETAILED: RIGHT SUPERIOR LATERAL MALAR CHEEK
LOCATION DETAILED: LEFT LATERAL FOREHEAD
LOCATION DETAILED: LEFT MID TEMPLE
LOCATION DETAILED: LEFT SUPERIOR LATERAL MALAR CHEEK
LOCATION DETAILED: RIGHT FOREHEAD
LOCATION DETAILED: RIGHT INFERIOR MEDIAL FOREHEAD
LOCATION DETAILED: LEFT INFERIOR MEDIAL FOREHEAD
LOCATION DETAILED: LEFT SUPERIOR FOREHEAD
LOCATION DETAILED: RIGHT MID TEMPLE
LOCATION DETAILED: RIGHT SUPERIOR MEDIAL FOREHEAD

## 2018-12-17 ASSESSMENT — LOCATION ZONE DERM: LOCATION ZONE: FACE

## 2018-12-17 NOTE — PROCEDURE: BOTOX
Levator Labii Superioris Units: 0
Dilution (U/0.1 Cc): 4
Detail Level: Detailed
Consent: Written consent obtained. Risks include but not limited to lid/brow ptosis, bruising, swelling, diplopia, temporary effect, incomplete chemical denervation.
Lot #: [id number]
Forehead Units: 8
Price (Use Numbers Only, No Special Characters Or $): 144
Post-Care Instructions: Patient instructed to not lie down for 4 hours and limit physical activity for 24 hours. Patient instructed not to travel by airplane for 48 hours.
Expiration Date (Month Year): 06/2021

## 2018-12-19 ENCOUNTER — RX ONLY (OUTPATIENT)
Age: 68
Setting detail: RX ONLY
End: 2018-12-19

## 2018-12-19 RX ORDER — TRETINOIN 0.25 MG/G
CREAM TOPICAL
Qty: 1 | Refills: 2 | Status: ERX

## 2019-01-03 ENCOUNTER — RX ONLY (OUTPATIENT)
Age: 69
Setting detail: RX ONLY
End: 2019-01-03

## 2019-01-03 RX ORDER — ADAPALENE 3 MG/G
GEL TOPICAL
Qty: 1 | Refills: 3 | Status: ERX

## 2019-01-31 ENCOUNTER — APPOINTMENT (RX ONLY)
Dept: URBAN - NONMETROPOLITAN AREA CLINIC 1 | Facility: CLINIC | Age: 69
Setting detail: DERMATOLOGY
End: 2019-01-31

## 2019-01-31 DIAGNOSIS — Z41.9 ENCOUNTER FOR PROCEDURE FOR PURPOSES OTHER THAN REMEDYING HEALTH STATE, UNSPECIFIED: ICD-10-CM

## 2019-01-31 DIAGNOSIS — L81.4 OTHER MELANIN HYPERPIGMENTATION: ICD-10-CM

## 2019-01-31 DIAGNOSIS — L72.0 EPIDERMAL CYST: ICD-10-CM

## 2019-01-31 PROCEDURE — ? BOTOX

## 2019-01-31 PROCEDURE — ? BENIGN DESTRUCTION COSMETIC

## 2019-01-31 ASSESSMENT — LOCATION ZONE DERM
LOCATION ZONE: EYELID
LOCATION ZONE: FACE

## 2019-01-31 ASSESSMENT — LOCATION SIMPLE DESCRIPTION DERM
LOCATION SIMPLE: LEFT CHEEK
LOCATION SIMPLE: RIGHT SUPERIOR EYELID
LOCATION SIMPLE: RIGHT CHEEK

## 2019-01-31 ASSESSMENT — LOCATION DETAILED DESCRIPTION DERM
LOCATION DETAILED: RIGHT CENTRAL BUCCAL CHEEK
LOCATION DETAILED: RIGHT LATERAL SUPERIOR EYELID
LOCATION DETAILED: RIGHT LATERAL MANDIBULAR CHEEK
LOCATION DETAILED: LEFT SUPERIOR LATERAL BUCCAL CHEEK

## 2019-01-31 NOTE — PROCEDURE: BENIGN DESTRUCTION COSMETIC
Post-Care Instructions: Patient instructed to wash treated area(s) with mild soap and water.  Patient is to wear sun protection, and avoid picking at the treated lesion.
Detail Level: Simple
Anesthesia Volume In Cc: 0.5
Price (Use Numbers Only, No Special Characters Or $): 0
Consent: Discussed with patient risks of crusting, scabbing, blistering, scarring, darker or lighter pigmentary change, recurrence, incomplete removal and infection.

## 2019-01-31 NOTE — PROCEDURE: BOTOX
Reconstitution Date (Optional): 01/29/19, 01/31/19
Masseter Units: 0
Forehead Units: 6
Price (Use Numbers Only, No Special Characters Or $): 778
Glabellar Complex Units: 12
Post-Care Instructions: Patient instructed to not lie down for 4 hours and limit physical activity for 24 hours. Patient instructed not to travel by airplane for 48 hours.
Dilution (U/0.1 Cc): 4
Expiration Date (Month Year): 5/2021
Detail Level: Detailed
Periorbital Skin Units: 24
Consent: Written consent obtained. Risks include but not limited to lid/brow ptosis, bruising, swelling, diplopia, temporary effect, incomplete chemical denervation.
Lot #: [id number]
Nasal Root Units: 8

## 2019-02-27 ENCOUNTER — APPOINTMENT (RX ONLY)
Dept: URBAN - NONMETROPOLITAN AREA CLINIC 1 | Facility: CLINIC | Age: 69
Setting detail: DERMATOLOGY
End: 2019-02-27

## 2019-02-27 DIAGNOSIS — L73.8 OTHER SPECIFIED FOLLICULAR DISORDERS: ICD-10-CM

## 2019-02-27 PROCEDURE — ? PUNCH EXCISION (SELF PAY-NO PATHOLOGY)

## 2019-02-27 ASSESSMENT — LOCATION ZONE DERM: LOCATION ZONE: FACE

## 2019-02-27 ASSESSMENT — LOCATION DETAILED DESCRIPTION DERM: LOCATION DETAILED: LEFT INFERIOR CENTRAL MALAR CHEEK

## 2019-02-27 ASSESSMENT — LOCATION SIMPLE DESCRIPTION DERM: LOCATION SIMPLE: LEFT CHEEK

## 2019-02-27 NOTE — PROCEDURE: PUNCH EXCISION (SELF PAY-NO PATHOLOGY)
Punch Size In Mm: 2
6 Mm Punch Excision Text: A 6 mm punch biopsy was used to excise the lesion to the level of the subcutaneous fat.  Blunt dissection was used to free the lesion from the surrounding tissues and the lesion was removed.
3 Mm Punch Excision Text: A 3 mm punch biopsy was used to excise the lesion to the level of the subcutaneous fat.  Blunt dissection was used to free the lesion from the surrounding tissues and the lesion was removed.
Intermediate / Complex Repair - Final Wound Length In Cm: 0
12 Mm Punch Excision Text: A 12 mm punch biopsy was used to excise the lesion to the level of the subcutaneous fat.  Blunt dissection was used to free the lesion from the surrounding tissues and the lesion was removed.
5 Mm Punch Excision Text: A 5 mm punch biopsy was used to excise the lesion to the level of the subcutaneous fat.  Blunt dissection was used to free the lesion from the surrounding tissues and the lesion was removed.
7 Mm Punch Excision Text: A 7 mm punch biopsy was used to excise the lesion to the level of the subcutaneous fat.  Blunt dissection was used to free the lesion from the surrounding tissues and the lesion was removed.
3.5 Mm Punch Excision Text: A 3.5 mm punch biopsy was used to excise the lesion to the level of the subcutaneous fat.  Blunt dissection was used to free the lesion from the surrounding tissues and the lesion was removed.
Size Of Lesion (*Required): 0.4
Epidermal Closure: simple interrupted
Render Post-Care Instructions In Note?: no
Anesthesia Type: 1% lidocaine with epinephrine
Price (Use Numbers Only, No Special Characters Or $): 50
Consent was obtained from the patient. The risks and benefits to therapy were discussed in detail. Specifically, the risks of infection, scarring, bleeding, prolonged wound healing, incomplete removal, allergy to anesthesia, nerve injury and recurrence were addressed. Prior to the procedure, the treatment site was clearly identified and confirmed by the patient. All components of Universal Protocol/PAUSE Rule completed.
Include Undermining Statement In The Repair Note?: Yes
Hemostasis: Electrocautery
Suture Removal: 7 days
8 Mm Punch Excision Text: A 8 mm punch biopsy was used to excise the lesion to the level of the subcutaneous fat.  Blunt dissection was used to free the lesion from the surrounding tissues and the lesion was removed.
Repair Type: None (Simple)
Anesthesia Volume In Cc: 0.5
X Size Of Lesion Width In Cm (Optional): 0.1
4 Mm Punch Excision Text: A 4 mm punch biopsy was used to excise the lesion to the level of the subcutaneous fat.  Blunt dissection was used to free the lesion from the surrounding tissues and the lesion was removed.
1.5 Mm Punch Excision Text: A 1.5 mm punch biopsy was used to excise the lesion to the level of the subcutaneous fat.  Blunt dissection was used to free the lesion from the surrounding tissues and the lesion was removed.
Epidermal Sutures: 5-0 Surgipro
Wound Care: Petrolatum
Post-Care Instructions: I reviewed with the patient in detail post-care instructions. Patient is to keep the biopsy site dry overnight, and then apply bacitracin twice daily until healed. Patient may apply hydrogen peroxide soaks to remove any crusting.
2.5 Mm Punch Excision Text: A 2.5 mm punch biopsy was used to excise the lesion to the level of the subcutaneous fat.  Blunt dissection was used to free the lesion from the surrounding tissues and the lesion was removed.
Path Notes (To The Dermatopathologist): Please check margins.
4.5 Mm Punch Excision Text: A 4.5 mm punch biopsy was used to excise the lesion to the level of the subcutaneous fat.  Blunt dissection was used to free the lesion from the surrounding tissues and the lesion was removed.
2 Mm Punch Excision Text: A 2 mm punch biopsy was used to excise the lesion to the level of the subcutaneous fat.  Blunt dissection was used to free the lesion from the surrounding tissues and the lesion was removed.
Detail Level: Detailed
10 Mm Punch Excision Text: A 10 mm punch biopsy was used to excise the lesion to the level of the subcutaneous fat.  Blunt dissection was used to free the lesion from the surrounding tissues and the lesion was removed.

## 2019-03-07 ENCOUNTER — APPOINTMENT (RX ONLY)
Dept: URBAN - NONMETROPOLITAN AREA CLINIC 1 | Facility: CLINIC | Age: 69
Setting detail: DERMATOLOGY
End: 2019-03-07

## 2019-03-07 DIAGNOSIS — Z41.9 ENCOUNTER FOR PROCEDURE FOR PURPOSES OTHER THAN REMEDYING HEALTH STATE, UNSPECIFIED: ICD-10-CM

## 2019-03-07 DIAGNOSIS — Z48.02 ENCOUNTER FOR REMOVAL OF SUTURES: ICD-10-CM

## 2019-03-07 PROCEDURE — ? COSMETIC CONSULTATION: FRACTIONAL RESURFACING

## 2019-03-07 PROCEDURE — ? SUTURE REMOVAL (NO GLOBAL PERIOD)

## 2019-03-07 ASSESSMENT — LOCATION DETAILED DESCRIPTION DERM: LOCATION DETAILED: LEFT INFERIOR MEDIAL MALAR CHEEK

## 2019-03-07 ASSESSMENT — LOCATION SIMPLE DESCRIPTION DERM: LOCATION SIMPLE: LEFT CHEEK

## 2019-03-07 ASSESSMENT — LOCATION ZONE DERM: LOCATION ZONE: FACE

## 2019-03-08 ENCOUNTER — APPOINTMENT (RX ONLY)
Dept: URBAN - NONMETROPOLITAN AREA CLINIC 1 | Facility: CLINIC | Age: 69
Setting detail: DERMATOLOGY
End: 2019-03-08

## 2019-04-19 ENCOUNTER — APPOINTMENT (RX ONLY)
Dept: URBAN - NONMETROPOLITAN AREA CLINIC 1 | Facility: CLINIC | Age: 69
Setting detail: DERMATOLOGY
End: 2019-04-19

## 2019-04-19 DIAGNOSIS — Z41.9 ENCOUNTER FOR PROCEDURE FOR PURPOSES OTHER THAN REMEDYING HEALTH STATE, UNSPECIFIED: ICD-10-CM

## 2019-04-19 PROCEDURE — ? BOTOX

## 2019-04-19 NOTE — PROCEDURE: BOTOX
Reconstitution Date (Optional): 04/17/2019
Expiration Date (Month Year): 5/2021
Additional Area 4 Units: 0
Post-Care Instructions: Patient instructed to not lie down for 4 hours and limit physical activity for 24 hours. Patient instructed not to travel by airplane for 48 hours.
Price (Use Numbers Only, No Special Characters Or $): 240
Forehead Units: 8
Consent: Written consent obtained. Risks include but not limited to lid/brow ptosis, bruising, swelling, diplopia, temporary effect, incomplete chemical denervation.
Dilution (U/0.1 Cc): 4
Lot #: [id number]
Detail Level: Detailed

## 2019-06-20 ENCOUNTER — APPOINTMENT (RX ONLY)
Dept: URBAN - NONMETROPOLITAN AREA CLINIC 1 | Facility: CLINIC | Age: 69
Setting detail: DERMATOLOGY
End: 2019-06-20

## 2019-06-20 DIAGNOSIS — Z41.9 ENCOUNTER FOR PROCEDURE FOR PURPOSES OTHER THAN REMEDYING HEALTH STATE, UNSPECIFIED: ICD-10-CM

## 2019-06-20 PROCEDURE — ? ADDITIONAL NOTES

## 2019-06-20 PROCEDURE — ? FILLERS

## 2019-06-20 PROCEDURE — ? BOTOX

## 2019-06-20 NOTE — PROCEDURE: BOTOX
Levator Labii Superioris Units: 0
Consent: Written consent reviewed by RN and signed by pt. Risks include but not limited to lid/brow ptosis, bruising, swelling, diplopia, temporary effect, incomplete chemical denervation.  \\nPt denies pregnancy. \\nIf pt is breastfeeding, they are counseled to dispose of breast milk for 24 hours post botox injection. \\nPt denies current administration of anti-biotics. \\nPt denies allergy to albumin or lactose. \\nPt's taking blood thinners are counseled on the increased risk of bleeding and bruising at the injection site.
Detail Level: Detailed
Forehead Units: 8
Post-Care Instructions: Patient instructed to not lie down for 4 hours and limit physical activity for 24 hours. RN recommends topical arnica and/or ice if bruising presents.\\n\\nPt instructed to contact the clinic with any questions, concerns, or post treatment complications, ie: the pt feels the product did not work for them, etc.  \\n\\Paty pt concerns and questions addressed and pt verbalized understanding.
Price (Use Numbers Only, No Special Characters Or $): 408
Dilution (U/0.1 Cc): 4
Glabellar Complex Units: 10
Periorbital Skin Units: 16

## 2019-06-20 NOTE — PROCEDURE: FILLERS
Vermilion Lips Filler Volume In Cc: 0
Marionette Lines Filler Volume In Cc: 1
Include Cannula Information In Note?: No
Consent: Written consent reviewed by RN, signed by pt.\\nRisks include but not limited to bruising, bleeding, irregular texture, ulceration, infection, allergic reaction, scar formation, incomplete augmentation, temporary nature, procedural pain, and vessel occlusion.  All pt concerns and questions addressed, pt verbalized understanding.
Detail Level: Detailed
Post-Care Instructions: Patient instructed to apply ice to reduce swelling. RN also recommends Arnica gel to reduce bruising. Pt instructed to contact the provider if complications arise.
Price (Use Numbers Only, No Special Characters Or $): 129
Topical Anesthesia?: 23% lidocaine, 7% tetracaine
Filler: Juvederm Vollure XC
Map Statment: See Attach Map for Complete Details
Lot #: [id number]
Lot #: [id number]
Lot #: [id number]

## 2019-06-20 NOTE — PROCEDURE: ADDITIONAL NOTES
Additional Notes: I counseled the patient regarding the following:\\n\\nRN discussed the risks and benefits of botox including but not limited to bruising, muscle weakness, lid or brow ptosis, double vision, facial weakness, headaches, procedural pain, temporary benefit only.\\n\\nIt is impossible to know the exact location of the vasculature in the treatment area. If a vein or artery is compromised by the injection, pt may experience an increase in bruising as a result. This is temporary and usually resolves in a few days to a few weeks. \\n\\nPt understands botox is a neuro-modulator that is injected into the muscle, which relaxes the muscle movement. \\n\\nPatient understands that treatment with botox only lessens dynamic wrinkles on a temporary basis, usually for 2-3 months. \\n\\nPt is aware of the variability in patient response, including a lack of response to treatment, and that no guarantee of length of benefit can be made. \\n\\nPt taking blood thinners prior to treatment may experience an increased risk for bruising and bleeding at the injection site. \\n\\nPatient should not lie down for 4 hours after injections nor exercise for 24 hours. \\n\\nIf bruising presents, pt may utilize oral or topical arnica and ice for treatment. \\n\\nPt instructed to contact the clinic with any questions, concerns, or post treatment complications; ie: pt does not feel the product worked for them, etc.\\n\\nPatient understands that the treatment is cosmetic in nature and not covered by insurance.\\n\\Paty pt concerns and questions addressed and pt verbalized understanding.
Detail Level: Simple

## 2019-07-10 ENCOUNTER — APPOINTMENT (RX ONLY)
Dept: URBAN - NONMETROPOLITAN AREA CLINIC 1 | Facility: CLINIC | Age: 69
Setting detail: DERMATOLOGY
End: 2019-07-10

## 2019-07-10 DIAGNOSIS — Z41.9 ENCOUNTER FOR PROCEDURE FOR PURPOSES OTHER THAN REMEDYING HEALTH STATE, UNSPECIFIED: ICD-10-CM

## 2019-07-10 DIAGNOSIS — D485 NEOPLASM OF UNCERTAIN BEHAVIOR OF SKIN: ICD-10-CM

## 2019-07-10 PROBLEM — D48.5 NEOPLASM OF UNCERTAIN BEHAVIOR OF SKIN: Status: ACTIVE | Noted: 2019-07-10

## 2019-07-10 PROCEDURE — 11104 PUNCH BX SKIN SINGLE LESION: CPT

## 2019-07-10 PROCEDURE — ? BIOPSY BY PUNCH METHOD

## 2019-07-10 PROCEDURE — ? ADDITIONAL NOTES

## 2019-07-10 PROCEDURE — ? BOTOX

## 2019-07-10 PROCEDURE — ? COSMETIC CONSULTATION: LASER RESURFACING

## 2019-07-10 PROCEDURE — ? COSMETIC FOLLOW-UP

## 2019-07-10 ASSESSMENT — LOCATION ZONE DERM: LOCATION ZONE: NECK

## 2019-07-10 ASSESSMENT — LOCATION DETAILED DESCRIPTION DERM: LOCATION DETAILED: RIGHT SUPERIOR LATERAL NECK

## 2019-07-10 ASSESSMENT — LOCATION SIMPLE DESCRIPTION DERM: LOCATION SIMPLE: RIGHT ANTERIOR NECK

## 2019-07-10 NOTE — PROCEDURE: BIOPSY BY PUNCH METHOD
Lab Facility: [id number]
Detail Level: Detailed
Home Suture Removal Text: Patient was provided a home suture removal kit and will remove their sutures at home.  If they have any questions or difficulties they will call the office.
Punch Size In Mm: 4
Bill For Surgical Tray: no
Anesthesia Type: 1% lidocaine with epinephrine and a 1:10 solution of 8.4% sodium bicarbonate
Size Of Lesion In Cm (Optional): 0.7
Consent: Written consent was obtained and risks were reviewed including but not limited to scarring, infection, bleeding, scabbing, incomplete removal, nerve damage and allergy to anesthesia.
Additional Anesthesia Volume In Cc (Will Not Render If 0): 0
Notification Instructions: Patient will be notified of biopsy results. However, patient instructed to call the office if not contacted within 2 weeks.
Suture Removal: 14 days
X Size Of Lesion In Cm (Optional): 0.5
Was A Bandage Applied: Yes
Biopsy Type: H and E
Anesthesia Volume In Cc: 1
Wound Care: Vaseline
Billing Type: Third-Party Bill
Epidermal Sutures: 4-0 Surgipro
Body Location Override (Optional - Billing Will Still Be Based On Selected Body Map Location If Applicable): right neck
Lab: [id number]
Hemostasis: None
Post-Care Instructions: I reviewed with the patient in detail post-care instructions. Patient is to keep the biopsy site dry overnight, and then apply Polysporin twice daily until healed. Patient may apply hydrogen peroxide soaks to remove any crusting.
Dressing: bandage

## 2019-07-10 NOTE — PROCEDURE: ADDITIONAL NOTES
Detail Level: Simple
Additional Notes: I counseled the patient regarding the following:\\n\\nRN discussed the risks and benefits of botox including but not limited to bruising, muscle weakness, lid or brow ptosis, double vision, facial weakness, headaches, procedural pain, temporary benefit only.\\n\\nIt is impossible to know the exact location of the vasculature in the treatment area. If a vein or artery is compromised by the injection, pt may experience an increase in bruising as a result. This is temporary and usually resolves in a few days to a few weeks. \\n\\nPt understands botox is a neuro-modulator that is injected into the muscle, which relaxes the muscle movement. \\n\\nPatient understands that treatment with botox only lessens dynamic wrinkles on a temporary basis, usually for 2-3 months. \\n\\nPt is aware of the variability in patient response, including a lack of response to treatment, and that no guarantee of length of benefit can be made. \\n\\nPt taking blood thinners prior to treatment may experience an increased risk for bruising and bleeding at the injection site. \\n\\nPatient should not lie down for 4 hours after injections nor exercise for 24 hours. \\n\\nIf bruising presents, pt may utilize oral or topical arnica and ice for treatment. \\n\\nPt instructed to contact the clinic with any questions, concerns, or post treatment complications; ie: pt does not feel the product worked for them, etc.\\n\\nPatient understands that the treatment is cosmetic in nature and not covered by insurance.\\n\\Paty pt concerns and questions addressed and pt verbalized understanding.

## 2019-07-10 NOTE — PROCEDURE: BOTOX
Anterior Platysmal Bands Units: 0
Dilution (U/0.1 Cc): 4
Consent: Written consent reviewed by RN and signed by pt. Risks include but not limited to lid/brow ptosis, bruising, swelling, diplopia, temporary effect, incomplete chemical denervation.  \\nPt denies pregnancy. \\nIf pt is breastfeeding, they are counseled to dispose of breast milk for 24 hours post botox injection. \\nPt denies current administration of anti-biotics. \\nPt denies allergy to albumin or lactose. \\nPt's taking blood thinners are counseled on the increased risk of bleeding and bruising at the injection site.
Post-Care Instructions: Patient instructed to not lie down for 4 hours and limit physical activity for 24 hours. RN recommends topical arnica and/or ice if bruising presents.\\n\\nPt instructed to contact the clinic with any questions, concerns, or post treatment complications, ie: the pt feels the product did not work for them, etc.  \\n\\Paty pt concerns and questions addressed and pt verbalized understanding.
Lot #: [id number]
Detail Level: Detailed
Price (Use Numbers Only, No Special Characters Or $): 96

## 2019-07-10 NOTE — PROCEDURE: COSMETIC FOLLOW-UP
Treatment Override (Free Text): Botox and filler
Price (Use Numbers Only, No Special Characters Or $): 0
Detail Level: Zone
Comments (Free Text): Pt states she feels she needs more Botox in the GLB “11’s” and the forehead. A touch up was provided and the dosage will be increased at the next visit. \\nCompared before and after photos for  the lower face. A significant improvement was visible and noted by the pt. The pt stated “I don’t know, I’m never happy.  It’s just my age.” Despite this comment, the pt seemed pleased.

## 2019-07-24 ENCOUNTER — APPOINTMENT (RX ONLY)
Dept: URBAN - NONMETROPOLITAN AREA CLINIC 1 | Facility: CLINIC | Age: 69
Setting detail: DERMATOLOGY
End: 2019-07-24

## 2019-07-24 DIAGNOSIS — Z48.02 ENCOUNTER FOR REMOVAL OF SUTURES: ICD-10-CM

## 2019-07-24 PROCEDURE — 99024 POSTOP FOLLOW-UP VISIT: CPT

## 2019-07-24 PROCEDURE — ? SUTURE REMOVAL (GLOBAL PERIOD)

## 2019-07-24 ASSESSMENT — LOCATION DETAILED DESCRIPTION DERM: LOCATION DETAILED: RIGHT INFERIOR LATERAL NECK

## 2019-07-24 ASSESSMENT — LOCATION ZONE DERM: LOCATION ZONE: NECK

## 2019-07-24 ASSESSMENT — LOCATION SIMPLE DESCRIPTION DERM: LOCATION SIMPLE: RIGHT ANTERIOR NECK

## 2019-07-24 NOTE — PROCEDURE: SUTURE REMOVAL (GLOBAL PERIOD)
Add 06378 Cpt? (Important Note: In 2017 The Use Of 89299 Is Being Tracked By Cms To Determine Future Global Period Reimbursement For Global Periods): yes
Detail Level: Simple

## 2019-07-31 ENCOUNTER — APPOINTMENT (RX ONLY)
Dept: URBAN - NONMETROPOLITAN AREA CLINIC 1 | Facility: CLINIC | Age: 69
Setting detail: DERMATOLOGY
End: 2019-07-31

## 2019-07-31 DIAGNOSIS — Z41.9 ENCOUNTER FOR PROCEDURE FOR PURPOSES OTHER THAN REMEDYING HEALTH STATE, UNSPECIFIED: ICD-10-CM

## 2019-07-31 PROCEDURE — ? ADDITIONAL NOTES

## 2019-07-31 PROCEDURE — ? CLEAR AND BRILLIANT LASER

## 2019-07-31 ASSESSMENT — LOCATION DETAILED DESCRIPTION DERM: LOCATION DETAILED: INFERIOR MID FOREHEAD

## 2019-07-31 ASSESSMENT — LOCATION ZONE DERM: LOCATION ZONE: FACE

## 2019-07-31 ASSESSMENT — LOCATION SIMPLE DESCRIPTION DERM: LOCATION SIMPLE: INFERIOR FOREHEAD

## 2019-07-31 NOTE — PROCEDURE: ADDITIONAL NOTES
Additional Notes: Clear and Forsyth Laser Patient Post Care Instructions\\n\\nThe Clear & Forsyth Treatment is a safe and effective laser resurfacing treatment for fine lines, wrinkles, and superficial pigment, such as melasma.  \\n\\Mulugeta with any cosmetic treatment, no guarantees can be made and results vary individually from patient to patient.  This information sheet outlines expectations pre and post treatment and provides a reference for your home routine after treatment.  \\n\\Kaelyn treatment done in the office can be improved or worsened by what you do at home both before and after.  Your provider requests you bring your home skincare products with you for an assessment; either during the consult or on the day of service.  If you do not currently use skin care, a post treatment kit with products safe to use post treatment can be purchased for a minimal cost.  \\n\\nThe most common cause of complications is failing to follow these recommendations.\\n\\nBefore your treatment\\n• Discontinue retinol, retinoid, vitamin A, glycolic acids, salicylic acids, or other exfoliating products, such as scrubs, 2 days before treatment. \\n• Avoid excessive sun exposure; sunburned skin cannot be treated. \\n• Please inform your provider if you experience cold sores.  \\n Laser treatments can stimulate cold sores but an oral, anti-viral medication can be prescribed for prevention of an outbreak.  \\n You may take the anti-viral 3 days before and 3 days post the treatment to suppress possible outbreaks. \\n• Bring your skincare products with you for assessment of safe application after treatment.  \\n\\nDuring your treatment\\n• The consent will be reviewed and any questions or concerns you have will be addressed prior to beginning the treatment. \\n• Photos for before and after comparison are taken. \\n• A topical numbing agent, usually Lidocaine and Tetracaine, is applied to the treatment area.  \\n The Clear & Forsyth treatment feels like prickly heat while performed with a warm sensation, similar to a sunburn, for a few minutes to a few hours post treatment.  \\n Once the treatment area has become numb, the topical anesthetic will be removed. \\n• Eye protection is not necessary for this treatment.  However, the provider may provide you with eye protection or request your eyes remain closed during the treatment. \\n• The treatment should be tolerable.  If you are experiencing significant discomfort, please inform the provider and adjustments can be made to ensure your comfort. \\n\\nAfter your treatment\\n• Immediately after the treatment, you will experience redness and a heat sensation similar to a sunburn.  \\n Most redness resolves during the first day after treatment, but a kranthi “glow” can remain for several days. \\n You may apply makeup as soon as 3 hours post treatment. \\n You may use ice or cool compresses to calm the sensation. \\n• Swelling is possible post treatment but usually resolves within 24 hours.  \\n You may use ice packs or take oral anti-inflammatory medications to diminish these affects. \\n• Over the next few days, you will feel a dry, sandpaper-like texture to your skin.  However, the texture is usually not visible to others or appears as dry skin.  \\n DO NOT pick at the flaking, this can cause hyperpigmentation; apply moisturizer as frequently as necessary to calm this affect and it should resolve in a few days. \\n You may wear make-up if you desire and remember to protect from the sun especially while healing. \\n• Most patients will have an improved appearance in 3 to 5 days.  Treatments can be performed monthly. \\n• Sun protection and wind protection is absolutely necessary following your treatment and especially until the skin has fully healed.  \\n It is recommended you avoid the sun completely for the first 24 to 48 hours post treatment. \\n If sun exposure is unavoidable, it is important to be diligent in reapplication of a broad-spectrum sunscreen every 90 minutes. In addition, protecting the treatment area with a wide brimmed hat or clothing is highly recommended.  \\n Excessive sun exposure while the skin is still healing can cause hyperpigmentation or other complications. \\n\\nHome Care After Your Clear & Forsyth Treatment\\nInstructions for Home Care Until the Skin Has Fully Healed\\n• The first 24-48 hours post treatment AVOID:\\n Direct sun exposure; it is recommended to protect the treatment area with clothing and a wide brimmed hat in addition to topical application of broad-spectrum sunscreen. \\n Excessive heat exposure such at saunas and hot tubs. \\n Excessive, intense exercise that stimulates a lot of internal heat or perspiration.    \\n Smoking and excessive alcohol intake.\\n• Cleanse with a gentle .\\n Cleansing is important to prevent breakouts. \\n If you are acne prone an anti-microbial spray may be recommended to suppress an acne flare. \\n• Moisturize with a gentle moisturizer approved by your provider. \\n• AVOID corrective products such as: \\n Retinol/Retinoids\\n Alpha Hydroxy Acids (AHAs)\\n Beta Hydroxy Acids (BHAs)\\n Salicylic Acids\\n Glycolic Acids\\n Products labeled “Anti-aging,” “Skin Renewing,” or “Exfoliating,” are not recommended while you are healing. \\n If in doubt of a product’s safety, avoid using the product and stick to the approved products discussed with your provider. \\n• While some redness, flaking, crusting, and swelling is to be expected, if you feel you are having an abnormal reaction to the treatment you may contact the our office at [phone] at any time. \\n Some patients may experience a blistering of the skin after their treatment.  This is very rare but if you do experience a blister, DO NOT pick at the top of the blister. You may apply thick moisturizer such as Aquaphor or an anti-bacterial ointment to the blister and allow it to heal.  In most cases, the skin will return to normal once healed.\\n• PROTECT the treatment area from the sun with a broad-spectrum sunscreen\\n Remember to reapply every 90 minutes when you are exposed to the sun. \\nEspecially in the first 24-48 hours post treatment it is recommended to protect the treatment area with clothing or a wide-brimmed hat in addition to topical sunscreen application.  \\n• Once the skin is fully healed you may return to your normal skin care routine. \\nIf you have other questions or concerns, you may contact the office at anytime; [phone].  [facility] hopes you enjoy your experience and result!\\nOther Instructions:
Detail Level: Simple

## 2019-07-31 NOTE — PROCEDURE: CLEAR AND BRILLIANT LASER
Pre-Procedure Text: Pre treatment photos taken and topical numbing removed with a warm towel prior to procedure.\\n\\nAppropriate eye protection placed on pt and pt instructed to keep their eyes closed during the treatment.
Post-Procedure Care: 8 passes with C&B handpiece, additional pass over areas of concern and 2 passes, low level under eyes. Moisturizer and sun protection applied to treatment area.
Detail Level: Zone
Consent: Written consent reviewed by RN and signed by pt.  Risks reviewed including but not limited to redness, heat sensation, swelling, pigmentary changes, scabbing, crusting, blistering, and reactivation of cold sores. \\n\\nNo guarantees can be made and results vary from pt to pt.
Price (Use Numbers Only, No Special Characters Or $): 985
Energy Level: High
Post-Care Instructions: RN reviewed with the patient in detail post-care instructions.  Pt aware of post treatment pain, swelling, redness, and rough texture, which can last days.  Strict sun avoidance and protection emphasized.  If sun exposure is unavoidable, pt instructed to cover tx area with clothing. \\n\\Paty pt concerns and questions addressed, pt verbalized understanding.\\n\\nReviewed pts home skincare products; all products are approved for application post tx with the exception of the facial scrub, which the pt should DC until the skin has healed.
Laser Type: Clear and Brilliant Permea 1927nm
Topical Anesthesia?: 23% lidocaine, 7% tetracaine
Length Of Topical Anesthesia Application (Optional): 15 minutes

## 2019-08-16 ENCOUNTER — APPOINTMENT (RX ONLY)
Dept: URBAN - NONMETROPOLITAN AREA CLINIC 1 | Facility: CLINIC | Age: 69
Setting detail: DERMATOLOGY
End: 2019-08-16

## 2019-08-16 DIAGNOSIS — Z41.9 ENCOUNTER FOR PROCEDURE FOR PURPOSES OTHER THAN REMEDYING HEALTH STATE, UNSPECIFIED: ICD-10-CM

## 2019-08-16 PROCEDURE — ? BOTOX

## 2019-08-16 PROCEDURE — ? COSMETIC FOLLOW-UP

## 2019-08-16 PROCEDURE — ? COSMETIC CONSULTATION: BOTULINUM TOXIN

## 2019-08-16 PROCEDURE — ? FACIAL

## 2019-08-16 PROCEDURE — ? ADDITIONAL NOTES

## 2019-08-16 ASSESSMENT — LOCATION ZONE DERM: LOCATION ZONE: FACE

## 2019-08-16 ASSESSMENT — LOCATION SIMPLE DESCRIPTION DERM: LOCATION SIMPLE: INFERIOR FOREHEAD

## 2019-08-16 ASSESSMENT — LOCATION DETAILED DESCRIPTION DERM: LOCATION DETAILED: INFERIOR MID FOREHEAD

## 2019-08-16 NOTE — PROCEDURE: COSMETIC FOLLOW-UP
Detail Level: Zone
Global Improvement: Good
Price (Use Numbers Only, No Special Characters Or $): 0
Patient Satisfaction: Unsure
Treatment Override (Free Text): C&B
Comments (Free Text): Pt was unsure of result but once we compared before and after photos she could see the improvement. She will be returning for the second tx and committed to the tx plan.

## 2019-08-16 NOTE — PROCEDURE: FACIAL
Extraction Method: extractor
Treatment Type Override: hydrating
Detail Level: Zone
Price (Use Numbers Only, No Special Characters Or $): 110
Exfoliation Type: exfoliant
Assessment: Photodamage
Facial Steaming: steamed
Exfoliation Type Override: Pumpkin Enzyme
Comments (Non-Sticky): Pt given samples of Alastin eye tx and neck cream.
Comments (Sticky): Luzern and PCA products used; cleanse, exfoliation,  hydrating masque with massage.  Luzern FCB, rehydrate, WE3, creme intensive, eye luxe, and lip balm, PCA neck and decollete cream, Alastin hydratint for sun protection. \\n\\nPt may resume normal skin care routine immediately after treatment. \\n\\Paty pt concerns and questions addressed.

## 2019-08-16 NOTE — PROCEDURE: BOTOX
Detail Level: Detailed
Inferior Lateral Orbicularis Oculi Units: 0
Post-Care Instructions: Patient instructed to not lie down for 4 hours and limit physical activity for 24 hours. RN recommends topical arnica and/or ice if bruising presents.\\n\\nPt instructed to contact the clinic with any questions, concerns, or post treatment complications, ie: the pt feels the product did not work for them, etc.  \\n\\Paty pt concerns and questions addressed and pt verbalized understanding.
Lot #: [id number]
Forehead Units: 12
Price (Use Numbers Only, No Special Characters Or $): 000
Glabellar Complex Units: 8
Periorbital Skin Units: 24
Dilution (U/0.1 Cc): 4
Consent: Written consent reviewed by RN and signed by pt. Risks include but not limited to lid/brow ptosis, bruising, swelling, diplopia, temporary effect, incomplete chemical denervation.  \\nPt denies pregnancy. \\nIf pt is breastfeeding, they are counseled to dispose of breast milk for 24 hours post botox injection. \\nPt denies current administration of anti-biotics. \\nPt denies allergy to albumin or lactose. \\nPt's taking blood thinners are counseled on the increased risk of bleeding and bruising at the injection site.

## 2019-08-29 ENCOUNTER — APPOINTMENT (RX ONLY)
Dept: URBAN - NONMETROPOLITAN AREA CLINIC 1 | Facility: CLINIC | Age: 69
Setting detail: DERMATOLOGY
End: 2019-08-29

## 2019-08-29 DIAGNOSIS — T07XXXA INSECT BITE, NONVENOMOUS, OF OTHER, MULTIPLE, AND UNSPECIFIED SITES, WITHOUT MENTION OF INFECTION: ICD-10-CM

## 2019-08-29 DIAGNOSIS — Z41.9 ENCOUNTER FOR PROCEDURE FOR PURPOSES OTHER THAN REMEDYING HEALTH STATE, UNSPECIFIED: ICD-10-CM

## 2019-08-29 PROBLEM — S50.361A INSECT BITE (NONVENOMOUS) OF RIGHT ELBOW, INITIAL ENCOUNTER: Status: ACTIVE | Noted: 2019-08-29

## 2019-08-29 PROBLEM — S90.561A INSECT BITE (NONVENOMOUS), RIGHT ANKLE, INITIAL ENCOUNTER: Status: ACTIVE | Noted: 2019-08-29

## 2019-08-29 PROCEDURE — ? COUNSELING

## 2019-08-29 PROCEDURE — 99212 OFFICE O/P EST SF 10 MIN: CPT

## 2019-08-29 PROCEDURE — ? PRESCRIPTION

## 2019-08-29 PROCEDURE — ? CLEAR AND BRILLIANT LASER

## 2019-08-29 PROCEDURE — ? TREATMENT REGIMEN

## 2019-08-29 PROCEDURE — ? ADDITIONAL NOTES

## 2019-08-29 RX ORDER — TRIAMCINOLONE ACETONIDE 1 MG/G
CREAM TOPICAL
Qty: 1 | Refills: 1 | Status: ERX | COMMUNITY
Start: 2019-08-29

## 2019-08-29 RX ADMIN — TRIAMCINOLONE ACETONIDE 1: 1 CREAM TOPICAL at 00:00

## 2019-08-29 ASSESSMENT — LOCATION DETAILED DESCRIPTION DERM
LOCATION DETAILED: RIGHT ANTERIOR MEDIAL MALLEOLUS
LOCATION DETAILED: RIGHT ELBOW
LOCATION DETAILED: INFERIOR MID FOREHEAD

## 2019-08-29 ASSESSMENT — LOCATION ZONE DERM
LOCATION ZONE: ARM
LOCATION ZONE: FACE
LOCATION ZONE: LEG

## 2019-08-29 ASSESSMENT — LOCATION SIMPLE DESCRIPTION DERM
LOCATION SIMPLE: RIGHT ELBOW
LOCATION SIMPLE: INFERIOR FOREHEAD
LOCATION SIMPLE: RIGHT ANKLE

## 2019-08-29 NOTE — PROCEDURE: CLEAR AND BRILLIANT LASER
Laser Type: Clear+Brilliant
Topical Anesthesia?: 23% lidocaine, 7% tetracaine
Price (Use Numbers Only, No Special Characters Or $): 985
Post-Care Instructions: RN reviewed with the patient in detail post-care instructions.  Pt aware of post treatment pain, swelling, redness, and rough texture, which can last days.  Strict sun avoidance and protection emphasized.  If sun exposure is unavoidable, pt instructed to cover tx area with clothing. \\n\\Paty pt concerns and questions addressed, pt verbalized understanding.
Energy Level: High
Length Of Topical Anesthesia Application (Optional): 30 minutes
Post-Procedure Care: 8 passes with C&B handpiece, additional pass over areas of concern and 4 passes, low level under eyes. Alastin Nectar and hydratint sun protection applied to treatment area.
Consent: Written consent reviewed by RN and signed by pt.  Risks reviewed including but not limited to redness, heat sensation, swelling, pigmentary changes, scabbing, crusting, blistering, and reactivation of cold sores. \\n\\nNo guarantees can be made and results vary from pt to pt.
Pre-Procedure Text: Topical numbing removed with a warm towel prior to procedure.\\n\\nAppropriate eye protection placed on pt and pt instructed to keep their eyes closed during the treatment.
Detail Level: Zone

## 2019-08-29 NOTE — PROCEDURE: ADDITIONAL NOTES
Additional Notes: Clear and [place] Laser Patient Post Care Instructions\\n\\nThe Clear & [place] Treatment is a safe and effective laser resurfacing treatment for fine lines, wrinkles, and superficial pigment, such as melasma.  \\n\\Mulugeta with any cosmetic treatment, no guarantees can be made and results vary individually from patient to patient.  This information sheet outlines expectations pre and post treatment and provides a reference for your home routine after treatment.  \\n\\Kaelyn treatment done in the office can be improved or worsened by what you do at home both before and after.  Your provider requests you bring your home skincare products with you for an assessment; either during the consult or on the day of service.  If you do not currently use skin care, a post treatment kit with products safe to use post treatment can be purchased for a minimal cost.  \\n\\nThe most common cause of complications is failing to follow these recommendations.\\n\\nBefore your treatment\\n• Discontinue retinol, retinoid, vitamin A, glycolic acids, salicylic acids, or other exfoliating products, such as scrubs, 2 days before treatment. \\n• Avoid excessive sun exposure; sunburned skin cannot be treated. \\n• Please inform your provider if you experience cold sores.  \\n Laser treatments can stimulate cold sores but an oral, anti-viral medication can be prescribed for prevention of an outbreak.  \\n You may take the anti-viral 3 days before and 3 days post the treatment to suppress possible outbreaks. \\n• Bring your skincare products with you for assessment of safe application after treatment.  \\n\\nDuring your treatment\\n• The consent will be reviewed and any questions or concerns you have will be addressed prior to beginning the treatment. \\n• Photos for before and after comparison are taken. \\n• A topical numbing agent, usually Lidocaine and Tetracaine, is applied to the treatment area.  \\n The Clear & [place] treatment feels like prickly heat while performed with a warm sensation, similar to a sunburn, for a few minutes to a few hours post treatment.  \\n Once the treatment area has become numb, the topical anesthetic will be removed. \\n• Eye protection is not necessary for this treatment.  However, the provider may provide you with eye protection or request your eyes remain closed during the treatment. \\n• The treatment should be tolerable.  If you are experiencing significant discomfort, please inform the provider and adjustments can be made to ensure your comfort. \\n\\nAfter your treatment\\n• Immediately after the treatment, you will experience redness and a heat sensation similar to a sunburn.  \\n Most redness resolves during the first day after treatment, but a kranthi “glow” can remain for several days. \\n You may apply makeup as soon as 3 hours post treatment. \\n You may use ice or cool compresses to calm the sensation. \\n• Swelling is possible post treatment but usually resolves within 24 hours.  \\n You may use ice packs or take oral anti-inflammatory medications to diminish these affects. \\n• Over the next few days, you will feel a dry, sandpaper-like texture to your skin.  However, the texture is usually not visible to others or appears as dry skin.  \\n DO NOT pick at the flaking, this can cause hyperpigmentation; apply moisturizer as frequently as necessary to calm this affect and it should resolve in a few days. \\n You may wear make-up if you desire and remember to protect from the sun especially while healing. \\n• Most patients will have an improved appearance in 3 to 5 days.  Treatments can be performed monthly. \\n• Sun protection and wind protection is absolutely necessary following your treatment and especially until the skin has fully healed.  \\n It is recommended you avoid the sun completely for the first 24 to 48 hours post treatment. \\n If sun exposure is unavoidable, it is important to be diligent in reapplication of a broad-spectrum sunscreen every 90 minutes. In addition, protecting the treatment area with a wide brimmed hat or clothing is highly recommended.  \\n Excessive sun exposure while the skin is still healing can cause hyperpigmentation or other complications. \\n\\nHome Care After Your Clear & [place] Treatment\\nInstructions for Home Care Until the Skin Has Fully Healed\\n• The first 24-48 hours post treatment AVOID:\\n Direct sun exposure; it is recommended to protect the treatment area with clothing and a wide brimmed hat in addition to topical application of broad-spectrum sunscreen. \\n Excessive heat exposure such at saunas and hot tubs. \\n Excessive, intense exercise that stimulates a lot of internal heat or perspiration.    \\n Smoking and excessive alcohol intake.\\n• Cleanse with a gentle .\\n Cleansing is important to prevent breakouts. \\n If you are acne prone an anti-microbial spray may be recommended to suppress an acne flare. \\n• Moisturize with a gentle moisturizer approved by your provider. \\n• AVOID corrective products such as: \\n Retinol/Retinoids\\n Alpha Hydroxy Acids (AHAs)\\n Beta Hydroxy Acids (BHAs)\\n Salicylic Acids\\n Glycolic Acids\\n Products labeled “Anti-aging,” “Skin Renewing,” or “Exfoliating,” are not recommended while you are healing. \\n If in doubt of a product’s safety, avoid using the product and stick to the approved products discussed with your provider. \\n• While some redness, flaking, crusting, and swelling is to be expected, if you feel you are having an abnormal reaction to the treatment you may contact the our office at [phone] at any time. \\n Some patients may experience a blistering of the skin after their treatment.  This is very rare but if you do experience a blister, DO NOT pick at the top of the blister. You may apply thick moisturizer such as Aquaphor or an anti-bacterial ointment to the blister and allow it to heal.  In most cases, the skin will return to normal once healed.\\n• PROTECT the treatment area from the sun with a broad-spectrum sunscreen\\n Remember to reapply every 90 minutes when you are exposed to the sun. \\nEspecially in the first 24-48 hours post treatment it is recommended to protect the treatment area with clothing or a wide-brimmed hat in addition to topical sunscreen application.  \\n• Once the skin is fully healed you may return to your normal skin care routine. \\nIf you have other questions or concerns, you may contact the office at anytime; [phone].  [facility] hopes you enjoy your experience and result!\\nOther Instructions:
Detail Level: Simple

## 2019-09-27 ENCOUNTER — APPOINTMENT (RX ONLY)
Dept: URBAN - NONMETROPOLITAN AREA CLINIC 1 | Facility: CLINIC | Age: 69
Setting detail: DERMATOLOGY
End: 2019-09-27

## 2019-09-27 DIAGNOSIS — Z41.9 ENCOUNTER FOR PROCEDURE FOR PURPOSES OTHER THAN REMEDYING HEALTH STATE, UNSPECIFIED: ICD-10-CM

## 2019-09-27 PROCEDURE — ? CLEAR AND BRILLIANT LASER

## 2019-09-27 PROCEDURE — ? ADDITIONAL NOTES

## 2019-09-27 ASSESSMENT — LOCATION ZONE DERM: LOCATION ZONE: FACE

## 2019-09-27 ASSESSMENT — LOCATION DETAILED DESCRIPTION DERM: LOCATION DETAILED: INFERIOR MID FOREHEAD

## 2019-09-27 ASSESSMENT — LOCATION SIMPLE DESCRIPTION DERM: LOCATION SIMPLE: INFERIOR FOREHEAD

## 2019-09-27 NOTE — PROCEDURE: ADDITIONAL NOTES
Detail Level: Simple
Additional Notes: Clear and [place] Laser Patient Post Care Instructions\\n\\nThe Clear & [place] Treatment is a safe and effective laser resurfacing treatment for fine lines, wrinkles, and superficial pigment, such as melasma.  \\n\\Mulugeta with any cosmetic treatment, no guarantees can be made and results vary individually from patient to patient.  This information sheet outlines expectations pre and post treatment and provides a reference for your home routine after treatment.  \\n\\Kaelyn treatment done in the office can be improved or worsened by what you do at home both before and after.  Your provider requests you bring your home skincare products with you for an assessment; either during the consult or on the day of service.  If you do not currently use skin care, a post treatment kit with products safe to use post treatment can be purchased for a minimal cost.  \\n\\nThe most common cause of complications is failing to follow these recommendations.\\n\\nBefore your treatment\\n• Discontinue retinol, retinoid, vitamin A, glycolic acids, salicylic acids, or other exfoliating products, such as scrubs, 2 days before treatment. \\n• Avoid excessive sun exposure; sunburned skin cannot be treated. \\n• Please inform your provider if you experience cold sores.  \\n Laser treatments can stimulate cold sores but an oral, anti-viral medication can be prescribed for prevention of an outbreak.  \\n You may take the anti-viral 3 days before and 3 days post the treatment to suppress possible outbreaks. \\n• Bring your skincare products with you for assessment of safe application after treatment.  \\n\\nDuring your treatment\\n• The consent will be reviewed and any questions or concerns you have will be addressed prior to beginning the treatment. \\n• Photos for before and after comparison are taken. \\n• A topical numbing agent, usually Lidocaine and Tetracaine, is applied to the treatment area.  \\n The Clear & [place] treatment feels like prickly heat while performed with a warm sensation, similar to a sunburn, for a few minutes to a few hours post treatment.  \\n Once the treatment area has become numb, the topical anesthetic will be removed. \\n• Eye protection is not necessary for this treatment.  However, the provider may provide you with eye protection or request your eyes remain closed during the treatment. \\n• The treatment should be tolerable.  If you are experiencing significant discomfort, please inform the provider and adjustments can be made to ensure your comfort. \\n\\nAfter your treatment\\n• Immediately after the treatment, you will experience redness and a heat sensation similar to a sunburn.  \\n Most redness resolves during the first day after treatment, but a kranthi “glow” can remain for several days. \\n You may apply makeup as soon as 3 hours post treatment. \\n You may use ice or cool compresses to calm the sensation. \\n• Swelling is possible post treatment but usually resolves within 24 hours.  \\n You may use ice packs or take oral anti-inflammatory medications to diminish these affects. \\n• Over the next few days, you will feel a dry, sandpaper-like texture to your skin.  However, the texture is usually not visible to others or appears as dry skin.  \\n DO NOT pick at the flaking, this can cause hyperpigmentation; apply moisturizer as frequently as necessary to calm this affect and it should resolve in a few days. \\n You may wear make-up if you desire and remember to protect from the sun especially while healing. \\n• Most patients will have an improved appearance in 3 to 5 days.  Treatments can be performed monthly. \\n• Sun protection and wind protection is absolutely necessary following your treatment and especially until the skin has fully healed.  \\n It is recommended you avoid the sun completely for the first 24 to 48 hours post treatment. \\n If sun exposure is unavoidable, it is important to be diligent in reapplication of a broad-spectrum sunscreen every 90 minutes. In addition, protecting the treatment area with a wide brimmed hat or clothing is highly recommended.  \\n Excessive sun exposure while the skin is still healing can cause hyperpigmentation or other complications. \\n\\nHome Care After Your Clear & [place] Treatment\\nInstructions for Home Care Until the Skin Has Fully Healed\\n• The first 24-48 hours post treatment AVOID:\\n Direct sun exposure; it is recommended to protect the treatment area with clothing and a wide brimmed hat in addition to topical application of broad-spectrum sunscreen. \\n Excessive heat exposure such at saunas and hot tubs. \\n Excessive, intense exercise that stimulates a lot of internal heat or perspiration.    \\n Smoking and excessive alcohol intake.\\n• Cleanse with a gentle .\\n Cleansing is important to prevent breakouts. \\n If you are acne prone an anti-microbial spray may be recommended to suppress an acne flare. \\n• Moisturize with a gentle moisturizer approved by your provider. \\n• AVOID corrective products such as: \\n Retinol/Retinoids\\n Alpha Hydroxy Acids (AHAs)\\n Beta Hydroxy Acids (BHAs)\\n Salicylic Acids\\n Glycolic Acids\\n Products labeled “Anti-aging,” “Skin Renewing,” or “Exfoliating,” are not recommended while you are healing. \\n If in doubt of a product’s safety, avoid using the product and stick to the approved products discussed with your provider. \\n• While some redness, flaking, crusting, and swelling is to be expected, if you feel you are having an abnormal reaction to the treatment you may contact the our office at [phone] at any time. \\n Some patients may experience a blistering of the skin after their treatment.  This is very rare but if you do experience a blister, DO NOT pick at the top of the blister. You may apply thick moisturizer such as Aquaphor or an anti-bacterial ointment to the blister and allow it to heal.  In most cases, the skin will return to normal once healed.\\n• PROTECT the treatment area from the sun with a broad-spectrum sunscreen\\n Remember to reapply every 90 minutes when you are exposed to the sun. \\nEspecially in the first 24-48 hours post treatment it is recommended to protect the treatment area with clothing or a wide-brimmed hat in addition to topical sunscreen application.  \\n• Once the skin is fully healed you may return to your normal skin care routine. \\nIf you have other questions or concerns, you may contact the office at anytime; [phone].  [facility] hopes you enjoy your experience and result!\\nOther Instructions:

## 2019-10-25 ENCOUNTER — APPOINTMENT (RX ONLY)
Dept: URBAN - NONMETROPOLITAN AREA CLINIC 1 | Facility: CLINIC | Age: 69
Setting detail: DERMATOLOGY
End: 2019-10-25

## 2019-10-25 DIAGNOSIS — Z41.9 ENCOUNTER FOR PROCEDURE FOR PURPOSES OTHER THAN REMEDYING HEALTH STATE, UNSPECIFIED: ICD-10-CM

## 2019-10-25 PROCEDURE — ? ADDITIONAL NOTES

## 2019-10-25 PROCEDURE — ? BOTOX

## 2019-10-25 NOTE — PROCEDURE: BOTOX
Nasal Root Units: 0
Periorbital Skin Units: 24
Detail Level: Detailed
Post-Care Instructions: Patient instructed to not lie down for 4 hours and limit physical activity for 24 hours. RN recommends topical arnica and/or ice if bruising presents.\\n\\nPt instructed to contact the clinic with any questions, concerns, or post treatment complications, ie: the pt feels the product did not work for them, etc.  \\n\\Paty pt concerns and questions addressed and pt verbalized understanding.
Consent: Written consent reviewed by RN and signed by pt. Risks include but not limited to lid/brow ptosis, bruising, swelling, diplopia, temporary effect, incomplete chemical denervation.  \\nPt denies pregnancy. \\nIf pt is breastfeeding, they are counseled to dispose of breast milk for 24 hours post botox injection. \\nPt denies current administration of anti-biotics. \\nPt denies allergy to albumin or lactose. \\nPt's taking blood thinners are counseled on the increased risk of bleeding and bruising at the injection site.
Depressor Anguli Oris Units: 8
Price (Use Numbers Only, No Special Characters Or $): 428
Dilution (U/0.1 Cc): 4
Lot #: [id number]
Forehead Units: 16

## 2020-01-16 ENCOUNTER — APPOINTMENT (RX ONLY)
Dept: URBAN - NONMETROPOLITAN AREA CLINIC 1 | Facility: CLINIC | Age: 70
Setting detail: DERMATOLOGY
End: 2020-01-16

## 2020-01-16 DIAGNOSIS — L82.0 INFLAMED SEBORRHEIC KERATOSIS: ICD-10-CM

## 2020-01-16 DIAGNOSIS — Z41.9 ENCOUNTER FOR PROCEDURE FOR PURPOSES OTHER THAN REMEDYING HEALTH STATE, UNSPECIFIED: ICD-10-CM

## 2020-01-16 PROCEDURE — ? BOTOX

## 2020-01-16 PROCEDURE — 17110 DESTRUCTION B9 LES UP TO 14: CPT

## 2020-01-16 PROCEDURE — ? COUNSELING

## 2020-01-16 PROCEDURE — ? LIQUID NITROGEN

## 2020-01-16 ASSESSMENT — LOCATION SIMPLE DESCRIPTION DERM
LOCATION SIMPLE: ABDOMEN
LOCATION SIMPLE: LEFT UPPER ARM
LOCATION SIMPLE: RIGHT FOREARM
LOCATION SIMPLE: RIGHT POSTERIOR UPPER ARM

## 2020-01-16 ASSESSMENT — LOCATION DETAILED DESCRIPTION DERM
LOCATION DETAILED: RIGHT DISTAL DORSAL FOREARM
LOCATION DETAILED: RIGHT RIB CAGE
LOCATION DETAILED: RIGHT PROXIMAL POSTERIOR UPPER ARM
LOCATION DETAILED: RIGHT PROXIMAL DORSAL FOREARM
LOCATION DETAILED: LEFT ANTERIOR DISTAL UPPER ARM

## 2020-01-16 ASSESSMENT — LOCATION ZONE DERM
LOCATION ZONE: ARM
LOCATION ZONE: TRUNK

## 2020-01-16 NOTE — PROCEDURE: LIQUID NITROGEN
Number Of Freeze-Thaw Cycles: 2 freeze-thaw cycles
Medical Necessity Information: It is in your best interest to select a reason for this procedure from the list below. All of these items fulfill various CMS LCD requirements except the new and changing color options.
Medical Necessity Clause: This procedure was medically necessary because the lesions that were treated were:
Detail Level: Simple
Post-Care Instructions: I reviewed with the patient in detail post-care instructions. Patient is to wear sunprotection, and avoid picking at any of the treated lesions. Pt may apply Vaseline to crusted or scabbing areas.
Add 52 Modifier (Optional): no
Render Post-Care Instructions In Note?: yes
Consent: The patient's consent was obtained including but not limited to risks of crusting, scabbing, blistering, scarring, darker or lighter pigmentary change, recurrence, incomplete removal and infection.

## 2020-01-16 NOTE — PROCEDURE: BOTOX
Show Lcl Units: No
Lateral Platysmal Bands Units: 0
Show Glabellar Units: Yes
Additional Area 1 Location: Lips
Forehead Units: 8
Consent: Written consent obtained. Risks include but not limited to lid/brow ptosis, bruising, swelling, diplopia, temporary effect, incomplete chemical denervation.
Lot #: [id number]
Glabellar Complex Units: 16
Expiration Date (Month Year): 08/2022
Detail Level: Detailed
Dilution (U/0.1 Cc): 4
Post-Care Instructions: Patient instructed to not lie down for 4 hours and limit physical activity for 24 hours. Patient instructed not to travel by airplane for 48 hours.
Additional Area 2 Location: Bunny lines
Price (Use Numbers Only, No Special Characters Or $): 331

## 2020-01-17 ENCOUNTER — APPOINTMENT (RX ONLY)
Dept: URBAN - NONMETROPOLITAN AREA CLINIC 1 | Facility: CLINIC | Age: 70
Setting detail: DERMATOLOGY
End: 2020-01-17

## 2020-01-17 DIAGNOSIS — Z41.9 ENCOUNTER FOR PROCEDURE FOR PURPOSES OTHER THAN REMEDYING HEALTH STATE, UNSPECIFIED: ICD-10-CM

## 2020-01-17 PROCEDURE — ? ADDITIONAL NOTES

## 2020-01-17 PROCEDURE — ? FILLERS

## 2020-01-17 PROCEDURE — ? BOTOX

## 2020-01-17 NOTE — PROCEDURE: BOTOX
Price (Use Numbers Only, No Special Characters Or $): 72
Additional Area 6 Units: 0
Lot #: [id number]
Consent: Written consent reviewed by RN and signed by pt. Risks include but not limited to lid/brow ptosis, bruising, swelling, diplopia, temporary effect, incomplete chemical denervation.  \\nPt denies pregnancy. \\nIf pt is breastfeeding, they are counseled to dispose of breast milk for 24 hours post botox injection. \\nPt denies current administration of anti-biotics. \\nPt denies allergy to albumin or lactose. \\nPt's taking blood thinners are counseled on the increased risk of bleeding and bruising at the injection site.
Post-Care Instructions: Patient instructed to not lie down for 4 hours and limit physical activity for 24 hours. RN recommends topical arnica and/or ice if bruising presents.\\n\\nPt instructed to contact the clinic with any questions, concerns, or post treatment complications, ie: the pt feels the product did not work for them, etc.  \\n\\Paty pt concerns and questions addressed and pt verbalized understanding.
Additional Area 1 Location: Upper lip
Additional Area 1 Units: 6
Dilution (U/0.1 Cc): 4
Detail Level: Detailed

## 2020-01-17 NOTE — PROCEDURE: FILLERS
Mid Face Filler Volume In Cc: 0
Lot #: [id number]
Include Cannula Information In Note?: No
Detail Level: Detailed
Consent: Written consent reviewed by RN and signed by pt. \\nRisks include but not limited to bruising, bleeding, irregular texture, ulceration, infection, allergic reaction, scar formation, incomplete augmentation, temporary nature, procedural pain, and vessel occlusion.  \\n\\Paty pt concerns and questions addressed, pt verbalized understanding.
Marionette Lines Filler Volume In Cc: 1
Price (Use Numbers Only, No Special Characters Or $): 846
Topical Anesthesia?: 23% lidocaine, 7% tetracaine
Expiration Date (Month Year): 03/25/2020
Filler: Juvederm Ultra Plus
Post-Care Instructions: Patient instructed to apply ice to reduce swelling. RN also recommends Arnica gel to reduce bruising. Pt instructed to contact the provider if complications arise.
Map Statment: See Attach Map for Complete Details

## 2020-02-05 ENCOUNTER — APPOINTMENT (RX ONLY)
Dept: URBAN - NONMETROPOLITAN AREA CLINIC 1 | Facility: CLINIC | Age: 70
Setting detail: DERMATOLOGY
End: 2020-02-05

## 2020-02-05 DIAGNOSIS — Z41.9 ENCOUNTER FOR PROCEDURE FOR PURPOSES OTHER THAN REMEDYING HEALTH STATE, UNSPECIFIED: ICD-10-CM

## 2020-02-05 PROCEDURE — ? COSMETIC FOLLOW-UP

## 2020-02-05 NOTE — PROCEDURE: COSMETIC FOLLOW-UP
Global Improvement: Very Good
Comments (Free Text): Pt states she, “is as happy as she ever is” after comparing before and after pictures.
Patient Satisfaction: Pleased
Price (Use Numbers Only, No Special Characters Or $): 0
Detail Level: Zone
Treatment Override (Free Text): Filler lower face.

## 2020-05-21 ENCOUNTER — OFFICE VISIT (OUTPATIENT)
Dept: ADMISSIONS | Facility: MEDICAL CENTER | Age: 70
End: 2020-05-21
Attending: ORTHOPAEDIC SURGERY
Payer: MEDICARE

## 2020-05-21 DIAGNOSIS — Z01.812 PRE-OPERATIVE LABORATORY EXAMINATION: ICD-10-CM

## 2020-05-21 LAB — COVID ORDER STATUS COVID19: NORMAL

## 2020-05-21 PROCEDURE — C9803 HOPD COVID-19 SPEC COLLECT: HCPCS

## 2020-05-22 RX ORDER — BACLOFEN 10 MG/1
10 TABLET ORAL 4 TIMES DAILY PRN
COMMUNITY
End: 2022-10-10

## 2020-05-22 RX ORDER — ACETAMINOPHEN 500 MG
500-1000 TABLET ORAL EVERY 6 HOURS PRN
COMMUNITY
End: 2022-10-10

## 2020-05-22 RX ORDER — MIRABEGRON 50 MG/1
1 TABLET, FILM COATED, EXTENDED RELEASE ORAL EVERY MORNING
COMMUNITY

## 2020-05-22 NOTE — OR NURSING
Pre-admit tele done. Patient had back surgery a year ago, is unstable at times, and has fallen in the past year. She has a cane and a walker if she needs one to get into her house.

## 2020-05-23 LAB
SARS-COV-2 RNA RESP QL NAA+PROBE: NOT DETECTED
SPECIMEN SOURCE: NORMAL

## 2020-05-25 ENCOUNTER — ANESTHESIA EVENT (OUTPATIENT)
Dept: SURGERY | Facility: MEDICAL CENTER | Age: 70
End: 2020-05-25
Payer: MEDICARE

## 2020-05-26 ENCOUNTER — HOSPITAL ENCOUNTER (OUTPATIENT)
Facility: MEDICAL CENTER | Age: 70
End: 2020-05-26
Attending: ORTHOPAEDIC SURGERY | Admitting: ORTHOPAEDIC SURGERY
Payer: MEDICARE

## 2020-05-26 ENCOUNTER — APPOINTMENT (OUTPATIENT)
Dept: RADIOLOGY | Facility: MEDICAL CENTER | Age: 70
End: 2020-05-26
Attending: ORTHOPAEDIC SURGERY
Payer: MEDICARE

## 2020-05-26 ENCOUNTER — ANESTHESIA (OUTPATIENT)
Dept: SURGERY | Facility: MEDICAL CENTER | Age: 70
End: 2020-05-26
Payer: MEDICARE

## 2020-05-26 VITALS
BODY MASS INDEX: 18.99 KG/M2 | RESPIRATION RATE: 18 BRPM | TEMPERATURE: 98 F | DIASTOLIC BLOOD PRESSURE: 91 MMHG | HEIGHT: 62 IN | SYSTOLIC BLOOD PRESSURE: 163 MMHG | WEIGHT: 103.17 LBS | OXYGEN SATURATION: 92 % | HEART RATE: 81 BPM

## 2020-05-26 PROCEDURE — A6223 GAUZE >16<=48 NO W/SAL W/O B: HCPCS | Performed by: ORTHOPAEDIC SURGERY

## 2020-05-26 PROCEDURE — 700102 HCHG RX REV CODE 250 W/ 637 OVERRIDE(OP): Performed by: ORTHOPAEDIC SURGERY

## 2020-05-26 PROCEDURE — 700105 HCHG RX REV CODE 258: Performed by: ORTHOPAEDIC SURGERY

## 2020-05-26 PROCEDURE — A9270 NON-COVERED ITEM OR SERVICE: HCPCS | Performed by: ORTHOPAEDIC SURGERY

## 2020-05-26 PROCEDURE — 160036 HCHG PACU - EA ADDL 30 MINS PHASE I: Performed by: ORTHOPAEDIC SURGERY

## 2020-05-26 PROCEDURE — 160039 HCHG SURGERY MINUTES - EA ADDL 1 MIN LEVEL 3: Performed by: ORTHOPAEDIC SURGERY

## 2020-05-26 PROCEDURE — 160025 RECOVERY II MINUTES (STATS): Performed by: ORTHOPAEDIC SURGERY

## 2020-05-26 PROCEDURE — 160048 HCHG OR STATISTICAL LEVEL 1-5: Performed by: ORTHOPAEDIC SURGERY

## 2020-05-26 PROCEDURE — 500881 HCHG PACK, EXTREMITY: Performed by: ORTHOPAEDIC SURGERY

## 2020-05-26 PROCEDURE — 700101 HCHG RX REV CODE 250: Performed by: ORTHOPAEDIC SURGERY

## 2020-05-26 PROCEDURE — 160046 HCHG PACU - 1ST 60 MINS PHASE II: Performed by: ORTHOPAEDIC SURGERY

## 2020-05-26 PROCEDURE — 160035 HCHG PACU - 1ST 60 MINS PHASE I: Performed by: ORTHOPAEDIC SURGERY

## 2020-05-26 PROCEDURE — 160028 HCHG SURGERY MINUTES - 1ST 30 MINS LEVEL 3: Performed by: ORTHOPAEDIC SURGERY

## 2020-05-26 PROCEDURE — A9270 NON-COVERED ITEM OR SERVICE: HCPCS | Performed by: ANESTHESIOLOGY

## 2020-05-26 PROCEDURE — 160002 HCHG RECOVERY MINUTES (STAT): Performed by: ORTHOPAEDIC SURGERY

## 2020-05-26 PROCEDURE — 700111 HCHG RX REV CODE 636 W/ 250 OVERRIDE (IP): Performed by: ANESTHESIOLOGY

## 2020-05-26 PROCEDURE — 700102 HCHG RX REV CODE 250 W/ 637 OVERRIDE(OP): Performed by: ANESTHESIOLOGY

## 2020-05-26 PROCEDURE — 501838 HCHG SUTURE GENERAL: Performed by: ORTHOPAEDIC SURGERY

## 2020-05-26 PROCEDURE — 700101 HCHG RX REV CODE 250: Performed by: ANESTHESIOLOGY

## 2020-05-26 PROCEDURE — 160009 HCHG ANES TIME/MIN: Performed by: ORTHOPAEDIC SURGERY

## 2020-05-26 PROCEDURE — 73630 X-RAY EXAM OF FOOT: CPT | Mod: RT

## 2020-05-26 PROCEDURE — 502000 HCHG MISC OR IMPLANTS RC 0278: Performed by: ORTHOPAEDIC SURGERY

## 2020-05-26 DEVICE — IMPLANTABLE DEVICE: Type: IMPLANTABLE DEVICE | Status: FUNCTIONAL

## 2020-05-26 RX ORDER — ALPRAZOLAM 0.5 MG/1
0.5 TABLET ORAL
COMMUNITY

## 2020-05-26 RX ORDER — HALOPERIDOL 5 MG/ML
1 INJECTION INTRAMUSCULAR
Status: DISCONTINUED | OUTPATIENT
Start: 2020-05-26 | End: 2020-05-26 | Stop reason: HOSPADM

## 2020-05-26 RX ORDER — DEXAMETHASONE SODIUM PHOSPHATE 4 MG/ML
INJECTION, SOLUTION INTRA-ARTICULAR; INTRALESIONAL; INTRAMUSCULAR; INTRAVENOUS; SOFT TISSUE PRN
Status: DISCONTINUED | OUTPATIENT
Start: 2020-05-26 | End: 2020-05-26 | Stop reason: SURG

## 2020-05-26 RX ORDER — BUPIVACAINE HYDROCHLORIDE 5 MG/ML
INJECTION, SOLUTION EPIDURAL; INTRACAUDAL
Status: DISCONTINUED | OUTPATIENT
Start: 2020-05-26 | End: 2020-05-26 | Stop reason: HOSPADM

## 2020-05-26 RX ORDER — OXYCODONE HCL 5 MG/5 ML
5 SOLUTION, ORAL ORAL
Status: DISCONTINUED | OUTPATIENT
Start: 2020-05-26 | End: 2020-05-26 | Stop reason: HOSPADM

## 2020-05-26 RX ORDER — HYDROMORPHONE HYDROCHLORIDE 1 MG/ML
0.5 INJECTION, SOLUTION INTRAMUSCULAR; INTRAVENOUS; SUBCUTANEOUS
Status: DISCONTINUED | OUTPATIENT
Start: 2020-05-26 | End: 2020-05-26 | Stop reason: HOSPADM

## 2020-05-26 RX ORDER — SODIUM CHLORIDE, SODIUM LACTATE, POTASSIUM CHLORIDE, CALCIUM CHLORIDE 600; 310; 30; 20 MG/100ML; MG/100ML; MG/100ML; MG/100ML
INJECTION, SOLUTION INTRAVENOUS CONTINUOUS
Status: DISCONTINUED | OUTPATIENT
Start: 2020-05-26 | End: 2020-05-26 | Stop reason: HOSPADM

## 2020-05-26 RX ORDER — OXYCODONE HCL 5 MG/5 ML
10 SOLUTION, ORAL ORAL
Status: DISCONTINUED | OUTPATIENT
Start: 2020-05-26 | End: 2020-05-26 | Stop reason: HOSPADM

## 2020-05-26 RX ORDER — ONDANSETRON 2 MG/ML
INJECTION INTRAMUSCULAR; INTRAVENOUS PRN
Status: DISCONTINUED | OUTPATIENT
Start: 2020-05-26 | End: 2020-05-26 | Stop reason: SURG

## 2020-05-26 RX ORDER — HYDRALAZINE HYDROCHLORIDE 20 MG/ML
5 INJECTION INTRAMUSCULAR; INTRAVENOUS
Status: DISCONTINUED | OUTPATIENT
Start: 2020-05-26 | End: 2020-05-26 | Stop reason: HOSPADM

## 2020-05-26 RX ORDER — MEPERIDINE HYDROCHLORIDE 25 MG/ML
12.5 INJECTION INTRAMUSCULAR; INTRAVENOUS; SUBCUTANEOUS
Status: DISCONTINUED | OUTPATIENT
Start: 2020-05-26 | End: 2020-05-26 | Stop reason: HOSPADM

## 2020-05-26 RX ORDER — HYDROMORPHONE HYDROCHLORIDE 1 MG/ML
0.2 INJECTION, SOLUTION INTRAMUSCULAR; INTRAVENOUS; SUBCUTANEOUS
Status: DISCONTINUED | OUTPATIENT
Start: 2020-05-26 | End: 2020-05-26 | Stop reason: HOSPADM

## 2020-05-26 RX ORDER — CEFAZOLIN SODIUM 1 G/3ML
INJECTION, POWDER, FOR SOLUTION INTRAMUSCULAR; INTRAVENOUS PRN
Status: DISCONTINUED | OUTPATIENT
Start: 2020-05-26 | End: 2020-05-26 | Stop reason: SURG

## 2020-05-26 RX ORDER — KETOROLAC TROMETHAMINE 30 MG/ML
INJECTION, SOLUTION INTRAMUSCULAR; INTRAVENOUS PRN
Status: DISCONTINUED | OUTPATIENT
Start: 2020-05-26 | End: 2020-05-26 | Stop reason: SURG

## 2020-05-26 RX ORDER — LIDOCAINE HYDROCHLORIDE 20 MG/ML
INJECTION, SOLUTION EPIDURAL; INFILTRATION; INTRACAUDAL; PERINEURAL PRN
Status: DISCONTINUED | OUTPATIENT
Start: 2020-05-26 | End: 2020-05-26 | Stop reason: SURG

## 2020-05-26 RX ORDER — DIPHENHYDRAMINE HCL 25 MG
25 TABLET ORAL ONCE
Status: COMPLETED | OUTPATIENT
Start: 2020-05-26 | End: 2020-05-26

## 2020-05-26 RX ORDER — HYDROMORPHONE HYDROCHLORIDE 1 MG/ML
0.4 INJECTION, SOLUTION INTRAMUSCULAR; INTRAVENOUS; SUBCUTANEOUS
Status: DISCONTINUED | OUTPATIENT
Start: 2020-05-26 | End: 2020-05-26 | Stop reason: HOSPADM

## 2020-05-26 RX ORDER — ONDANSETRON 2 MG/ML
4 INJECTION INTRAMUSCULAR; INTRAVENOUS
Status: DISCONTINUED | OUTPATIENT
Start: 2020-05-26 | End: 2020-05-26 | Stop reason: HOSPADM

## 2020-05-26 RX ORDER — MIDAZOLAM HYDROCHLORIDE 1 MG/ML
INJECTION INTRAMUSCULAR; INTRAVENOUS PRN
Status: DISCONTINUED | OUTPATIENT
Start: 2020-05-26 | End: 2020-05-26 | Stop reason: SURG

## 2020-05-26 RX ORDER — DIPHENHYDRAMINE HYDROCHLORIDE 50 MG/ML
12.5 INJECTION INTRAMUSCULAR; INTRAVENOUS
Status: DISCONTINUED | OUTPATIENT
Start: 2020-05-26 | End: 2020-05-26 | Stop reason: HOSPADM

## 2020-05-26 RX ADMIN — KETOROLAC TROMETHAMINE 30 MG: 30 INJECTION, SOLUTION INTRAMUSCULAR at 08:11

## 2020-05-26 RX ADMIN — DIPHENHYDRAMINE HYDROCHLORIDE 25 MG: 25 TABLET ORAL at 10:06

## 2020-05-26 RX ADMIN — ONDANSETRON 4 MG: 2 INJECTION INTRAMUSCULAR; INTRAVENOUS at 08:11

## 2020-05-26 RX ADMIN — DEXAMETHASONE SODIUM PHOSPHATE 8 MG: 4 INJECTION, SOLUTION INTRA-ARTICULAR; INTRALESIONAL; INTRAMUSCULAR; INTRAVENOUS; SOFT TISSUE at 07:55

## 2020-05-26 RX ADMIN — POVIDONE-IODINE 15 ML: 10 SOLUTION TOPICAL at 06:57

## 2020-05-26 RX ADMIN — LIDOCAINE HYDROCHLORIDE 0.3 ML: 10 INJECTION, SOLUTION EPIDURAL; INFILTRATION; INTRACAUDAL at 07:12

## 2020-05-26 RX ADMIN — PROPOFOL 120 MG: 10 INJECTION, EMULSION INTRAVENOUS at 07:51

## 2020-05-26 RX ADMIN — FENTANYL CITRATE 100 MCG: 50 INJECTION INTRAMUSCULAR; INTRAVENOUS at 07:51

## 2020-05-26 RX ADMIN — SODIUM CHLORIDE, POTASSIUM CHLORIDE, SODIUM LACTATE AND CALCIUM CHLORIDE: 600; 310; 30; 20 INJECTION, SOLUTION INTRAVENOUS at 07:13

## 2020-05-26 RX ADMIN — MIDAZOLAM HYDROCHLORIDE 2 MG: 1 INJECTION, SOLUTION INTRAMUSCULAR; INTRAVENOUS at 07:48

## 2020-05-26 RX ADMIN — LIDOCAINE HYDROCHLORIDE 50 MG: 20 INJECTION, SOLUTION EPIDURAL; INFILTRATION; INTRACAUDAL at 07:51

## 2020-05-26 RX ADMIN — LIDOCAINE HYDROCHLORIDE 0.3 ML: 10 INJECTION, SOLUTION EPIDURAL; INFILTRATION; INTRACAUDAL at 06:57

## 2020-05-26 RX ADMIN — CEFAZOLIN 2 G: 330 INJECTION, POWDER, FOR SOLUTION INTRAMUSCULAR; INTRAVENOUS at 07:48

## 2020-05-26 ASSESSMENT — PAIN SCALES - GENERAL: PAIN_LEVEL: 0

## 2020-05-26 NOTE — OR SURGEON
Immediate Post OP Note    PreOp Diagnosis: Right recurrent hallux valgus    PostOp Diagnosis: Same    Procedure(s):  BUNIONECTOMY- AKIN OSTEOTOMY - Wound Class: Clean    Surgeon(s):  Gordon Diaz M.D.    Anesthesiologist/Type of Anesthesia:  Anesthesiologist: Angel Escobar M.D./General    Surgical Staff:  Assistant: BOO Murdock  Circulator: Shine Ray R.N.; Awa Jones R.N.  Scrub Person: Gilles Sanchez  Radiology Technologist: July Driscoll    Specimens removed if any:  * No specimens in log *    Estimated Blood Loss: 5cc    TT: 11 in @ 250mmHg    Findings: Recurrent hallux valgus, improved with akin alone    Complications: None        5/26/2020 8:19 AM Gordon Diaz M.D.

## 2020-05-26 NOTE — OR NURSING
Patient OK to discharge per anesthesiologist. Discharge information reviewed with patient and responsible adult via telephone. Patient informed to return to ER for any worsening in symptoms such as oral swelling or for any respiratory symptoms. No questions or concerns at this time. IV DC'd.

## 2020-05-26 NOTE — ANESTHESIA PREPROCEDURE EVALUATION
Denies: MI/CHF/smoking/CVA/DM/CKD      Relevant Problems   No relevant active problems       Physical Exam    Airway   Mallampati: II  TM distance: >3 FB  Neck ROM: full       Cardiovascular - normal exam  Rhythm: regular  Rate: normal  (-) murmur     Dental - normal exam           Pulmonary - normal exam  Breath sounds clear to auscultation     Abdominal    Neurological - normal exam                 Anesthesia Plan    ASA 2       Plan - general       Airway plan will be LMA        Induction: intravenous    Postoperative Plan: Postoperative administration of opioids is intended.    Pertinent diagnostic labs and testing reviewed    Informed Consent:    Anesthetic plan and risks discussed with patient.    Use of blood products discussed with: patient whom consented to blood products.

## 2020-05-26 NOTE — ANESTHESIA TIME REPORT
Anesthesia Start and Stop Event Times     Date Time Event    5/26/2020 0743 Ready for Procedure     0748 Anesthesia Start     0822 Anesthesia Stop        Responsible Staff  05/26/20    Name Role Begin End    Angel Escobar M.D. Anesth 0748 0822        Preop Diagnosis (Free Text):  Pre-op Diagnosis     BUNION RIGHT        Preop Diagnosis (Codes):    Post op Diagnosis  Bunion of great toe of right foot      Premium Reason  Non-Premium    Comments:

## 2020-05-26 NOTE — OR NURSING
"Patient noted to have right eye swelling. Patient reports that the swelling is new and that it is \"itchy\". Patient denies any other accompanying symptoms. Anesthesiologist informed, orders received for oral benadryl.   "

## 2020-05-26 NOTE — OR NURSING
Patient has arrived to phase 2. Patient is alert and oriented, and denies pain at this time. Dressing to right foot is clean, dry, and intact. CMS intact.

## 2020-05-26 NOTE — ANESTHESIA POSTPROCEDURE EVALUATION
Patient: Angie Dover    Procedure Summary     Date:  05/26/20 Room / Location:  [place] [address] / SURGERY [facility]    Anesthesia Start:  0748 Anesthesia Stop:  0822    Procedure:  BUNIONECTOMY- AKIN OSTEOTOMY (Right Foot) Diagnosis:  (BUNION RIGHT)    Surgeon:  Gordon Diaz M.D. Responsible Provider:  Angel Escobar M.D.    Anesthesia Type:  general ASA Status:  2          Final Anesthesia Type: general  Last vitals  BP   Blood Pressure : (!) 170/98    Temp   36.9 °C (98.4 °F)    Pulse   Pulse: 86   Resp   18    SpO2   94 %      Anesthesia Post Evaluation    Patient location during evaluation: PACU  Patient participation: complete - patient participated  Level of consciousness: awake and alert  Pain score: 0    Airway patency: patent  Anesthetic complications: no  Cardiovascular status: hemodynamically stable  Respiratory status: acceptable  Hydration status: euvolemic    PONV: none           Nurse Pain Score: 0 (NPRS)

## 2020-05-26 NOTE — DISCHARGE INSTRUCTIONS
ACTIVITY: Rest and take it easy for the first 24 hours.  A responsible adult is recommended to remain with you during that time.  It is normal to feel sleepy.  We encourage you to not do anything that requires balance, judgment or coordination.    MILD FLU-LIKE SYMPTOMS ARE NORMAL. YOU MAY EXPERIENCE GENERALIZED MUSCLE ACHES, THROAT IRRITATION, HEADACHE AND/OR SOME NAUSEA.    FOR 24 HOURS DO NOT:  Drive, operate machinery or run household appliances.  Drink beer or alcoholic beverages.   Make important decisions or sign legal documents.    SPECIAL INSTRUCTIONS:  Weight bearing as tolerated to right lower extremity in postop shoe.  Shoe at all times including bed.  Ice and elevate right lower extremity.  Stay ahead of pain.  Keep dressing clean and dry.    DIET: To avoid nausea, slowly advance diet as tolerated, avoiding spicy or greasy foods for the first day.  Add more substantial food to your diet according to your physician's instructions. INCREASE FLUIDS AND FIBER TO AVOID CONSTIPATION.    SURGICAL DRESSING/BATHING:  Keep dressing clean and dry.  No baths/soaking in water/hot tubs.    FOLLOW-UP APPOINTMENT:  A follow-up appointment should be arranged with your doctor as scheduled; call to schedule.    You should CALL YOUR PHYSICIAN if you develop:  Fever greater than 101 degrees F.  Pain not relieved by medication, or persistent nausea or vomiting.  Excessive bleeding (blood soaking through dressing) or unexpected drainage from the wound.  Extreme redness or swelling around the incision site, drainage of pus or foul smelling drainage.  Inability to urinate or empty your bladder within 8 hours.  Problems with breathing or chest pain.    You should call 911 if you develop problems with breathing or chest pain.  If you are unable to contact your doctor or surgical center, you should go to the nearest emergency room or urgent care center.  Physician's telephone #: Dr. Diaz [phone]    If any questions  arise, call your doctor.  If your doctor is not available, please feel free to call the Surgical Center at [phone].  The Center is open Monday through Friday from 7AM to 7PM.  You can also call the HEALTH HOTLINE open 24 hours/day, 7 days/week and speak to a nurse at [phone], or toll free at [phone].    A registered nurse may call you a few days after your surgery to see how you are doing after your procedure.    MEDICATIONS: Resume taking daily medication.  Take prescribed pain medication with food.  If no medication is prescribed, you may take non-aspirin pain medication if needed.  PAIN MEDICATION CAN BE VERY CONSTIPATING.  Take a stool softener or laxative such as senokot, pericolace, or milk of magnesia if needed.    Prescription given for [place].    If your physician has prescribed pain medication that includes Acetaminophen (Tylenol), do not take additional Acetaminophen (Tylenol) while taking the prescribed medication.    Depression / Suicide Risk    As you are discharged from this [facility] facility, it is important to learn how to keep safe from harming yourself.    Recognize the warning signs:  · Abrupt changes in personality, positive or negative- including increase in energy   · Giving away possessions  · Change in eating patterns- significant weight changes-  positive or negative  · Change in sleeping patterns- unable to sleep or sleeping all the time   · Unwillingness or inability to communicate  · Depression  · Unusual sadness, discouragement and loneliness  · Talk of wanting to die  · Neglect of personal appearance   · Rebelliousness- reckless behavior  · Withdrawal from people/activities they love  · Confusion- inability to concentrate     If you or a loved one observes any of these behaviors or has concerns about self-harm, here's what you can do:  · Talk about it- your feelings and reasons for harming yourself  · Remove any means that you might use to hurt yourself  (examples: pills, rope, extension cords, firearm)  · Get professional help from the community (Mental Health, Substance Abuse, psychological counseling)  · Do not be alone:Call your Safe Contact- someone whom you trust who will be there for you.  · Call your local CRISIS HOTLINE 016-9770 or 933-745-9318  · Call your local Children's Mobile Crisis Response Team [place] (899) 082-4683 or www.[place]  · Call the toll free National Suicide Prevention Hotlines   · National Suicide Prevention Lifeline 082-032-OWFY (2457)  · National Hope Line Network 800-SUICIDE (844-3537)

## 2020-05-26 NOTE — OR NURSING
08:21  Patient arrived from the OR to PACU 3A.  Bedside report received.  VSS.  Will continue to monitor for pain/bleeding.      08:45  Attempted to contact family for update on patient status.  No answer at this time.      09:20  Report given to BRANDON Roldan.  Patient transported to Stage II recovery.

## 2020-05-26 NOTE — OP REPORT
DATE OF SERVICE:  05/26/2020    PREOPERATIVE DIAGNOSIS:  Right recurrent hallux valgus deformity.    POSTOPERATIVE DIAGNOSIS:  Right recurrent hallux valgus deformity.    PROCEDURE:  Right Akin osteotomy.    SURGEON:  Gordon Diaz MD    ASSISTANT:  SID Melo    ANESTHESIA:  General with 30 mL local ankle block performed by me.    ESTIMATED BLOOD LOSS:  5 mL.    TOURNIQUET TIME:  11 minutes at 250 mmHg.    FINDINGS:  Recurrent hallux valgus deformity, improved with just Med   osteotomy.    IMPLANTS:  Prakash 2.5 mm headless compression screw x1.    COMPLICATIONS:  None.    OUTCOME:  PACU in stable condition.    HISTORY OF PRESENT ILLNESS:  Pleasant 69-year-old female who had history of a   hallux valgus correction at an outside institution.  She has had recurrence   where her first toe is rubbing on her second causing pain in the second.  The   alignment of her second is quite good; therefore, addressing her hallux   valgus, I think, will improve her discomfort in the second toe.  She was   greeted in the preoperative holding area and identified by name and medical   record number.  The right lower extremity was marked.  Risks of procedure   including bleeding, infection, pain, malunion, nonunion, recurrence, and need   for more surgery were discussed.  She provided written consent.    DESCRIPTION OF PROCEDURE:  She was taken to the operating room, placed on the   table in supine position.  Preoperative antibiotics were administered.    General anesthesia was induced.  Nonsterile tourniquet was placed on the right   thigh.  Right lower extremity prepped and draped in the usual sterile   fashion.  An operative pause was undertaken, where all present were in   agreement with patient identification, laterality, and procedure to be   performed.  Limb was elevated for 5 minutes, tourniquet raised to 250 mmHg.  I   performed an ankle block, blocking all 5 nerves to the foot.    Akin osteotomy:  A 2 cm  longitudinal incision was made along the medial border   of the proximal phalanx.  Under direct radiographic guidance, we made a   wedge-shaped osteotomy and closed it medially.  We removed enough bone to   correct her alignment and relieve pressure on the second toe.  This was then   secured with a 2.5 mm headless compression screw, drilled again under direct   radiographic guidance from proximal to distal, medial to lateral, fixation was   excellent.  No evidence of interval complication.  Tourniquet was let down.    Hemostasis was achieved.  The incision was copiously irrigated.  Subcutaneous   layer closed with 3-0 Monocryl in buried interrupted fashion, skin closed with   3-0 nylon in horizontal mattress fashion.  Adaptic gauze and a compressive   wrap were placed.  Patient was awakened and extubated in stable condition.    POSTOPERATIVE COURSE:  She will discharge home today assuming adequate pain   control and mobilization, weightbearing as tolerated right lower extremity,   aspirin 81 mg twice daily for DVT prophylaxis.  I will see her in 10-14 days   for suture removal and wound check.       ____________________________________     MD TERE MARTE / DEBRA    DD:  05/26/2020 08:35:06  DT:  05/26/2020 09:08:32    D#:  [id number]  Job#:  [id number]

## 2020-05-26 NOTE — ANESTHESIA PROCEDURE NOTES
Airway    Date/Time: 5/26/2020 7:52 AM  Performed by: Angel Escobar M.D.  Authorized by: Angel Escobar M.D.     Location:  OR  Urgency:  Elective  Indications for Airway Management:  Anesthesia      Spontaneous Ventilation: absent    Sedation Level:  Deep  Preoxygenated: Yes    Final Airway Type:  Supraglottic airway  Final Supraglottic Airway:  Standard LMA    SGA Size:  3  Number of Attempts at Approach:  1   Atraumatic insertion, good seal

## 2020-06-03 ENCOUNTER — APPOINTMENT (RX ONLY)
Dept: URBAN - NONMETROPOLITAN AREA CLINIC 1 | Facility: CLINIC | Age: 70
Setting detail: DERMATOLOGY
End: 2020-06-03

## 2020-06-03 VITALS — TEMPERATURE: 98.2 F

## 2020-06-03 DIAGNOSIS — Z41.9 ENCOUNTER FOR PROCEDURE FOR PURPOSES OTHER THAN REMEDYING HEALTH STATE, UNSPECIFIED: ICD-10-CM

## 2020-06-03 PROCEDURE — ? BOTOX

## 2020-06-03 NOTE — PROCEDURE: BOTOX
Anterior Platysmal Bands Units: 0
Dilution (U/0.1 Cc): 4
Show Right And Left Brow Units: No
Show Nasal Units: Yes
Forehead Units: 12
Detail Level: Detailed
Post-Care Instructions: Patient instructed to not lie down for 4 hours and limit physical activity for 24 hours. Patient instructed not to travel by airplane for 48 hours.
Price (Use Numbers Only, No Special Characters Or $): 592
Lot #: [id number]
Additional Area 1 Location: Lips
Consent: Written consent obtained. Risks include but not limited to lid/brow ptosis, bruising, swelling, diplopia, temporary effect, incomplete chemical denervation.
Periorbital Skin Units: 16
Expiration Date (Month Year): 09/2022
Additional Area 2 Location: Bunny lines

## 2020-07-10 ENCOUNTER — APPOINTMENT (RX ONLY)
Dept: URBAN - NONMETROPOLITAN AREA CLINIC 1 | Facility: CLINIC | Age: 70
Setting detail: DERMATOLOGY
End: 2020-07-10

## 2020-07-10 VITALS — TEMPERATURE: 99.3 F

## 2020-07-10 DIAGNOSIS — Z41.9 ENCOUNTER FOR PROCEDURE FOR PURPOSES OTHER THAN REMEDYING HEALTH STATE, UNSPECIFIED: ICD-10-CM

## 2020-07-10 PROCEDURE — ? BOTOX

## 2020-07-10 PROCEDURE — ? ADDITIONAL NOTES

## 2020-07-10 NOTE — PROCEDURE: BOTOX
Additional Area 6 Units: 0
Lot #: [id number]
Forehead Units: 8
Dilution (U/0.1 Cc): 4
Detail Level: Detailed
Price (Use Numbers Only, No Special Characters Or $): 96
Consent: Written consent reviewed by RN and signed by pt. Risks include but not limited to lid/brow ptosis, bruising, swelling, diplopia, temporary effect, incomplete chemical denervation.  \\nPt denies pregnancy. \\nIf pt is breastfeeding, they are counseled to dispose of breast milk for 24 hours post botox injection. \\nPt denies current administration of anti-biotics. \\nPt denies allergy to albumin or lactose. \\nPt's taking blood thinners are counseled on the increased risk of bleeding and bruising at the injection site.
Post-Care Instructions: Patient instructed to not lie down for 4 hours and limit physical activity for 24 hours. RN recommends topical arnica and/or ice if bruising presents.\\n\\nPt instructed to contact the clinic with any questions, concerns, or post treatment complications, ie: the pt feels the product did not work for them, etc.  \\n\\Paty pt concerns and questions addressed and pt verbalized understanding.\\n\\n8 units complimentary to correct injection placement, product provided by [employer]. 8 units charged.

## 2020-09-11 ENCOUNTER — APPOINTMENT (RX ONLY)
Dept: URBAN - NONMETROPOLITAN AREA CLINIC 1 | Facility: CLINIC | Age: 70
Setting detail: DERMATOLOGY
End: 2020-09-11

## 2020-09-11 VITALS — TEMPERATURE: 97.6 F

## 2020-09-11 DIAGNOSIS — Z41.9 ENCOUNTER FOR PROCEDURE FOR PURPOSES OTHER THAN REMEDYING HEALTH STATE, UNSPECIFIED: ICD-10-CM

## 2020-09-11 PROCEDURE — ? MEDICAL CONSULTATION: PRODUCTS

## 2020-09-11 PROCEDURE — ? COSMETIC CONSULTATION - MICRO-NEEDLING

## 2020-09-11 PROCEDURE — ? BOTOX

## 2020-09-11 PROCEDURE — ? ADDITIONAL NOTES

## 2020-09-11 NOTE — PROCEDURE: BOTOX
Additional Area 3 Units: 0
Lot #: [id number]
Glabellar Complex Units: 20
Consent: Written consent reviewed by RN and signed by pt. Risks include but not limited to lid/brow ptosis, bruising, swelling, diplopia, temporary effect, incomplete chemical denervation.  \\nPt denies pregnancy. \\nIf pt is breastfeeding, they are counseled to dispose of breast milk for 24 hours post botox injection. \\nPt denies current administration of anti-biotics. \\nPt denies allergy to albumin or lactose. \\nPt's taking blood thinners are counseled on the increased risk of bleeding and bruising at the injection site.
Additional Area 2 Units: 4
Periorbital Skin Units: 28
Post-Care Instructions: Patient instructed to not lie down for 4 hours and limit physical activity for 24 hours. RN recommends topical arnica and/or ice if bruising presents.\\n\\nPt instructed to contact the clinic with any questions, concerns, or post treatment complications, ie: the pt feels the product did not work for them, etc.  \\n\\Paty pt concerns and questions addressed and pt verbalized understanding.
Additional Area 1 Location: upper lip
Price (Use Numbers Only, No Special Characters Or $): 1006
Detail Level: Detailed
Additional Area 2 Location: Bunnies
Additional Area 1 Units: 8
R Brow Units: 2

## 2020-09-25 ENCOUNTER — APPOINTMENT (RX ONLY)
Dept: URBAN - NONMETROPOLITAN AREA CLINIC 1 | Facility: CLINIC | Age: 70
Setting detail: DERMATOLOGY
End: 2020-09-25

## 2020-09-25 VITALS — TEMPERATURE: 98.1 F

## 2020-09-25 DIAGNOSIS — L98.8 OTHER SPECIFIED DISORDERS OF THE SKIN AND SUBCUTANEOUS TISSUE: ICD-10-CM

## 2020-09-25 DIAGNOSIS — Z41.9 ENCOUNTER FOR PROCEDURE FOR PURPOSES OTHER THAN REMEDYING HEALTH STATE, UNSPECIFIED: ICD-10-CM

## 2020-09-25 PROCEDURE — ? COSMETIC FOLLOW-UP

## 2020-09-25 PROCEDURE — ? MICRONEEDLING

## 2020-09-25 PROCEDURE — ? ADDITIONAL NOTES

## 2020-09-25 NOTE — PROCEDURE: MICRONEEDLING
Depth In Mm (Location #1): 1.75
Location #2: Forehead
Speed (Location #1): high
Location #4: nose
Detail Level: Zone
Length Of Topical Anesthesia Application (Optional): 20 minutes
Depth In Mm (Location #3): 0.25
Depth In Mm (Location #4): 0.5
Location #1: Cheeks/chin/upper lip
Consent: Written consent reviewed by RN and signed by pt.  Risks reviewed including but not limited to pain, swelling, scarring, infection, and incomplete improvement. \\Paty pt concerns and questions addressed and pt verbalized understanding.
Depth In Mm (Location #2): 1
Topical Anesthesia?: 23% lidocaine, 7% tetracaine
Location #3: under eye
Post-Care Instructions: Detailed post care reviewed and pt provided written post care instructions including a detailed list of products to avoid for the first 24 hours post tx.  After the initial 24 hour period, pt should use high quality, gentle moisturizer, and protect the treatment area with sunscreen.  Pt educated to continue use of high quality products and avoid retinoid type products until the skin is fully healed at which time they can return to their normal home care routine.  \\nPt verbalized understanding.
Price (Use Numbers Only, No Special Characters Or $): 619

## 2020-09-25 NOTE — PROCEDURE: ADDITIONAL NOTES
Additional Notes: Skin Pen Microneedling Treatment\\nPre and Post Procedure Instructions\\nPre-Procedure\\nFor best results and minimal downtime, it is recommended that the patient pre-treat their skin twice per day for up to two weeks with Alastin Skin Regenerating Nectar.  The patient will continue to use the Nectar for up to two weeks post treatment for best results.  \\nPrior to procedure precautions: \\n• Avoid excessive sun exposure/sunburns 24 hours prior to procedure\\n• Discontinue use of topical retinoids 72 hours prior to procedure\\n• Allow at least 24 hours after autoimmune therapies before Skin Pen treatment\\n• Wait 6 months following oral isotretinoin use\\n• If you experience cold sores please inform your practitioner; we can provide you with a prescription anti-viral medication to suppress the possibility of an outbreak\\nDuring the Procedure\\n1. The patient’s skin will be prepped with topical numbing agent. \\n2. Patient’s skin will be cleansed and a glide product applied to the skin for treatment. \\n3. Treatment with the microneedling device will take approximately 30 to 45 minutes. \\n4. Alastin Skin Regenerating Nectar will be applied to the skin post treatment. \\nPost Procedure Instructions and Expectations\\n It is important the patient understands and follows the post procedure instructions.  Microneedling creates “micro channels” where the tiny needle has entered and exited the skin creating an injury.  The micro-channels remain open for the first 24 hours post treatment.  During this 24 hour period, it of the upmost importance the patient applies the correct skin care to avoid complications.  If you wish, you may bring your products into the office so we may determine if they are safe for use.\\nListed below are product ingredients which the patient SHOULD NOT APPLY to their skin while the micro-channels are open:\\nProducts to AVOID for the first 24 hours post treatment: \\n• Silicone\\n• Dimethicone\\n• Methicone\\n• Dyes\\n• Fragrance\\n• Petrolatum or Petroleum \\n• Glycol or propylene glycol\\n• Mineral oil\\n• Sulphates, such as Sodium Peyton Sulphates or derivatives\\n• Parabens\\n• Citrus-derived preservatives\\n• Formaldehyde releasing preservatives\\n \\nProducts to consider for optimal results post treatment and are APPROVED for the application in the first 24 hours: \\n• Vitamin C\\n• Vitamin E\\n• Peptides\\n• Hyaluronic Acid\\n• Magnesium\\n• Growth Factors\\nPost Procedure Home Care\\nThe First 24 hours Post Treatment\\n1. WASH with a gentle cleanser and water, morning and night, lightly pat skin dry. \\n2. APPLY only Alastin [place] or other approved skin care for the first 24 hours post procedure. \\n3. MOISTURIZE with a soothing balm or appropriate moisturizer after the Nectar product.  \\na. You may reapply the moisturizer or soothing balm multiple times per day to soothe irritation and promote moisture in the skin. \\n4. AVOID sun for the first 24 hours post treatment.  \\n5. AVOID Strenuous exercise or excessive perspiration 24-48 hours post treatment.  \\na. Excessive blood flow and sweat can cause irritation and discomfort to the treatment area. \\n48 Hours to 2 Weeks Post Treatment\\n1. WASH with a gentle cleanser and water, morning and night, lightly pat skin dry. \\n2. APPLY Alastin [place], morning and night, for up to 2 weeks post treatment or until the skin has fully healed. \\n3. MOISTURIZE with a soothing balm or appropriate moisturizer after the Nectar product.  \\na. You may reapply the moisturizer or soothing balm multiple times per day to soothe irritation and promote moisture in the skin. \\nb. AVOID Home care corrective products, such as Citric Acid, Retinols/Retinoids, Alpha Hydroxy Acids, Beta Hydroxy Acids, scrubs, exfoliating products, cleansing brushes (such as the Clarisonic Brush), or other products considered to have active ingredients should be avoided until the skin is fully healed. \\n4. PROTECT your investment by applying a high quality, physical sunscreen.  \\na. Chemical sunscreens of any kind are not recommended until the skin has fully healed.  \\nb. If sun exposure cannot be avoided, assure you are reapplying the physical sunscreen every 90 minutes and protect the treatment area with clothing (a wide-brimmed hat, a buff, etc.). \\n5. RESUME your normal skin care routine once the skin has fully healed and discontinue the use of the Alastin Skin Regenerating Nectar product. \\nIf you have any questions or concerns about your treatment please contact the [facility] at \\[phone].  Thank you!
Detail Level: Simple

## 2020-09-25 NOTE — PROCEDURE: COSMETIC FOLLOW-UP
Treatment (Optional): Botox
Detail Level: Zone
Global Improvement: Very Good
Price (Use Numbers Only, No Special Characters Or $): 0
Patient Satisfaction: Pleased

## 2020-10-16 ENCOUNTER — APPOINTMENT (RX ONLY)
Dept: URBAN - NONMETROPOLITAN AREA CLINIC 1 | Facility: CLINIC | Age: 70
Setting detail: DERMATOLOGY
End: 2020-10-16

## 2020-10-16 VITALS — TEMPERATURE: 97.3 F

## 2020-10-16 DIAGNOSIS — Z41.9 ENCOUNTER FOR PROCEDURE FOR PURPOSES OTHER THAN REMEDYING HEALTH STATE, UNSPECIFIED: ICD-10-CM

## 2020-10-16 PROCEDURE — ? COSMETIC FOLLOW-UP

## 2020-10-16 NOTE — PROCEDURE: COSMETIC FOLLOW-UP
Patient Satisfaction: Very pleased
Global Improvement: Very Good
Treatment (Optional): Microneedling
Detail Level: Zone
Price (Use Numbers Only, No Special Characters Or $): 0

## 2020-11-18 ENCOUNTER — HOSPITAL ENCOUNTER (OUTPATIENT)
Dept: HOSPITAL 8 - STAR | Age: 70
Discharge: HOME | End: 2020-11-18
Attending: NEUROLOGICAL SURGERY
Payer: MEDICARE

## 2020-11-18 DIAGNOSIS — Z01.812: Primary | ICD-10-CM

## 2020-11-18 DIAGNOSIS — Z20.828: ICD-10-CM

## 2020-11-18 DIAGNOSIS — M47.12: ICD-10-CM

## 2020-11-18 LAB
ALBUMIN SERPL-MCNC: 3.9 G/DL (ref 3.4–5)
ALP SERPL-CCNC: 70 U/L (ref 45–117)
ALT SERPL-CCNC: 21 U/L (ref 12–78)
ANION GAP SERPL CALC-SCNC: 6 MMOL/L (ref 5–15)
BILIRUB SERPL-MCNC: 0.3 MG/DL (ref 0.2–1)
CALCIUM SERPL-MCNC: 9.1 MG/DL (ref 8.5–10.1)
CHLORIDE SERPL-SCNC: 102 MMOL/L (ref 98–107)
CREAT SERPL-MCNC: 0.83 MG/DL (ref 0.55–1.02)
PROT SERPL-MCNC: 7.5 G/DL (ref 6.4–8.2)

## 2020-11-18 PROCEDURE — 80053 COMPREHEN METABOLIC PANEL: CPT

## 2020-11-18 PROCEDURE — 87635 SARS-COV-2 COVID-19 AMP PRB: CPT

## 2020-11-18 PROCEDURE — 93005 ELECTROCARDIOGRAM TRACING: CPT

## 2020-12-09 ENCOUNTER — HOSPITAL ENCOUNTER (OUTPATIENT)
Facility: MEDICAL CENTER | Age: 70
End: 2020-12-09
Attending: NEUROLOGICAL SURGERY | Admitting: NEUROLOGICAL SURGERY
Payer: MEDICARE

## 2020-12-11 ENCOUNTER — APPOINTMENT (RX ONLY)
Dept: URBAN - NONMETROPOLITAN AREA CLINIC 1 | Facility: CLINIC | Age: 70
Setting detail: DERMATOLOGY
End: 2020-12-11

## 2020-12-11 VITALS — TEMPERATURE: 97.3 F

## 2020-12-11 DIAGNOSIS — Z41.9 ENCOUNTER FOR PROCEDURE FOR PURPOSES OTHER THAN REMEDYING HEALTH STATE, UNSPECIFIED: ICD-10-CM

## 2020-12-11 PROCEDURE — ? ADDITIONAL NOTES

## 2020-12-11 PROCEDURE — ? BOTOX

## 2020-12-11 NOTE — PROCEDURE: BOTOX
Additional Area 3 Units: 0
Lot #: [id number]
Glabellar Complex Units: 20
Consent: Written consent reviewed by RN and signed by pt. Risks include but not limited to lid/brow ptosis, bruising, swelling, diplopia, temporary effect, incomplete chemical denervation.  \\nPt denies pregnancy. \\nIf pt is breastfeeding, they are counseled to dispose of breast milk for 24 hours post botox injection. \\nPt denies current administration of anti-biotics. \\nPt denies allergy to albumin or lactose. \\nPt's taking blood thinners are counseled on the increased risk of bleeding and bruising at the injection site.
Additional Area 2 Units: 4
Periorbital Skin Units: 28
Post-Care Instructions: Patient instructed to not lie down for 4 hours and limit physical activity for 24 hours. RN recommends topical arnica and/or ice if bruising presents.\\n\\nPt instructed to contact the clinic with any questions, concerns, or post treatment complications, ie: the pt feels the product did not work for them, etc.  \\n\\Paty pt concerns and questions addressed and pt verbalized understanding.
Additional Area 1 Location: upper lip
Price (Use Numbers Only, No Special Characters Or $): 1000
Detail Level: Detailed
Additional Area 2 Location: Bunnies
Additional Area 1 Units: 8
R Brow Units: 2

## 2020-12-30 ENCOUNTER — HOSPITAL ENCOUNTER (OUTPATIENT)
Dept: HOSPITAL 8 - STAR | Age: 70
Discharge: HOME | End: 2020-12-30
Attending: NEUROLOGICAL SURGERY
Payer: MEDICARE

## 2020-12-30 DIAGNOSIS — J44.9: ICD-10-CM

## 2020-12-30 DIAGNOSIS — M47.812: ICD-10-CM

## 2020-12-30 DIAGNOSIS — R00.0: ICD-10-CM

## 2020-12-30 DIAGNOSIS — I51.7: ICD-10-CM

## 2020-12-30 DIAGNOSIS — Z01.818: Primary | ICD-10-CM

## 2020-12-30 DIAGNOSIS — Z20.828: ICD-10-CM

## 2020-12-30 LAB
ANION GAP SERPL CALC-SCNC: 4 MMOL/L (ref 5–15)
APTT BLD: 24 SECONDS (ref 25–31)
BASOPHILS # BLD AUTO: 0 X10^3/UL (ref 0–0.1)
BASOPHILS NFR BLD AUTO: 0 % (ref 0–1)
CALCIUM SERPL-MCNC: 9.1 MG/DL (ref 8.5–10.1)
CHLORIDE SERPL-SCNC: 106 MMOL/L (ref 98–107)
CREAT SERPL-MCNC: 0.76 MG/DL (ref 0.55–1.02)
EOSINOPHIL # BLD AUTO: 0 X10^3/UL (ref 0–0.4)
EOSINOPHIL NFR BLD AUTO: 0 % (ref 1–7)
ERYTHROCYTE [DISTWIDTH] IN BLOOD BY AUTOMATED COUNT: 13.7 % (ref 9.6–15.2)
INR PPP: 0.97 (ref 0.93–1.1)
LYMPHOCYTES # BLD AUTO: 1.6 X10^3/UL (ref 1–3.4)
LYMPHOCYTES NFR BLD AUTO: 30 % (ref 22–44)
MCH RBC QN AUTO: 33.7 PG (ref 27–34.8)
MCHC RBC AUTO-ENTMCNC: 33.5 G/DL (ref 32.4–35.8)
MD: NO
MICROSCOPIC: (no result)
MONOCYTES # BLD AUTO: 0.7 X10^3/UL (ref 0.2–0.8)
MONOCYTES NFR BLD AUTO: 14 % (ref 2–9)
NEUTROPHILS # BLD AUTO: 2.8 X10^3/UL (ref 1.8–6.8)
NEUTROPHILS NFR BLD AUTO: 55 % (ref 42–75)
PLATELET # BLD AUTO: 272 X10^3/UL (ref 130–400)
PMV BLD AUTO: 6.4 FL (ref 7.4–10.4)
PROTHROMBIN TIME: 10.3 SECONDS (ref 9.6–11.5)
RBC # BLD AUTO: 4.17 X10^6/UL (ref 3.82–5.3)

## 2020-12-30 PROCEDURE — 80048 BASIC METABOLIC PNL TOTAL CA: CPT

## 2020-12-30 PROCEDURE — 85730 THROMBOPLASTIN TIME PARTIAL: CPT

## 2020-12-30 PROCEDURE — 85610 PROTHROMBIN TIME: CPT

## 2020-12-30 PROCEDURE — 93005 ELECTROCARDIOGRAM TRACING: CPT

## 2020-12-30 PROCEDURE — 85025 COMPLETE CBC W/AUTO DIFF WBC: CPT

## 2020-12-30 PROCEDURE — 87635 SARS-COV-2 COVID-19 AMP PRB: CPT

## 2020-12-30 PROCEDURE — 71046 X-RAY EXAM CHEST 2 VIEWS: CPT

## 2020-12-30 PROCEDURE — 81003 URINALYSIS AUTO W/O SCOPE: CPT

## 2021-01-05 ENCOUNTER — HOSPITAL ENCOUNTER (OUTPATIENT)
Dept: HOSPITAL 8 - OUT | Age: 71
Setting detail: OBSERVATION
LOS: 2 days | Discharge: HOME | End: 2021-01-07
Attending: NEUROLOGICAL SURGERY | Admitting: NEUROLOGICAL SURGERY
Payer: MEDICARE

## 2021-01-05 VITALS — SYSTOLIC BLOOD PRESSURE: 159 MMHG | DIASTOLIC BLOOD PRESSURE: 107 MMHG

## 2021-01-05 VITALS — HEIGHT: 61 IN | BODY MASS INDEX: 18.31 KG/M2 | WEIGHT: 97 LBS

## 2021-01-05 DIAGNOSIS — G89.29: ICD-10-CM

## 2021-01-05 DIAGNOSIS — E66.9: ICD-10-CM

## 2021-01-05 DIAGNOSIS — G47.00: ICD-10-CM

## 2021-01-05 DIAGNOSIS — M19.90: ICD-10-CM

## 2021-01-05 DIAGNOSIS — Z79.899: ICD-10-CM

## 2021-01-05 DIAGNOSIS — F41.8: ICD-10-CM

## 2021-01-05 DIAGNOSIS — K21.9: ICD-10-CM

## 2021-01-05 DIAGNOSIS — M54.9: ICD-10-CM

## 2021-01-05 DIAGNOSIS — M81.0: ICD-10-CM

## 2021-01-05 DIAGNOSIS — M48.02: Primary | ICD-10-CM

## 2021-01-05 DIAGNOSIS — Z87.891: ICD-10-CM

## 2021-01-05 DIAGNOSIS — I10: ICD-10-CM

## 2021-01-05 DIAGNOSIS — Z88.0: ICD-10-CM

## 2021-01-05 DIAGNOSIS — G58.8: ICD-10-CM

## 2021-01-05 PROCEDURE — 96365 THER/PROPH/DIAG IV INF INIT: CPT

## 2021-01-05 PROCEDURE — 95938 SOMATOSENSORY TESTING: CPT

## 2021-01-05 PROCEDURE — 96372 THER/PROPH/DIAG INJ SC/IM: CPT

## 2021-01-05 PROCEDURE — 97162 PT EVAL MOD COMPLEX 30 MIN: CPT

## 2021-01-05 PROCEDURE — 96366 THER/PROPH/DIAG IV INF ADDON: CPT

## 2021-01-05 PROCEDURE — 96367 TX/PROPH/DG ADDL SEQ IV INF: CPT

## 2021-01-05 PROCEDURE — 97165 OT EVAL LOW COMPLEX 30 MIN: CPT

## 2021-01-05 PROCEDURE — 63045 LAM FACETEC & FORAMOT CRV: CPT

## 2021-01-05 PROCEDURE — C1760 CLOSURE DEV, VASC: HCPCS

## 2021-01-05 PROCEDURE — 96376 TX/PRO/DX INJ SAME DRUG ADON: CPT

## 2021-01-05 PROCEDURE — 63048 LAM FACETEC &FORAMOT EA ADDL: CPT

## 2021-01-05 PROCEDURE — G0378 HOSPITAL OBSERVATION PER HR: HCPCS

## 2021-01-05 PROCEDURE — 72040 X-RAY EXAM NECK SPINE 2-3 VW: CPT

## 2021-01-05 PROCEDURE — 95941 IONM REMOTE/>1 PT OR PER HR: CPT

## 2021-01-05 PROCEDURE — 96361 HYDRATE IV INFUSION ADD-ON: CPT

## 2021-01-05 PROCEDURE — 96375 TX/PRO/DX INJ NEW DRUG ADDON: CPT

## 2021-01-05 RX ADMIN — HYDROCODONE BITARTRATE AND ACETAMINOPHEN PRN TAB: 10; 325 TABLET ORAL at 21:25

## 2021-01-05 RX ADMIN — TRAZODONE HYDROCHLORIDE SCH MG: 100 TABLET, FILM COATED ORAL at 21:12

## 2021-01-05 RX ADMIN — SODIUM CHLORIDE AND POTASSIUM CHLORIDE SCH MLS/HR: .9; .15 SOLUTION INTRAVENOUS at 23:24

## 2021-01-05 RX ADMIN — ONDANSETRON PRN MG: 2 INJECTION, SOLUTION INTRAMUSCULAR; INTRAVENOUS at 23:45

## 2021-01-05 RX ADMIN — TIZANIDINE SCH MG: 2 TABLET ORAL at 21:12

## 2021-01-05 RX ADMIN — SODIUM CHLORIDE, PRESERVATIVE FREE SCH ML: 5 INJECTION INTRAVENOUS at 21:11

## 2021-01-06 VITALS — SYSTOLIC BLOOD PRESSURE: 179 MMHG | DIASTOLIC BLOOD PRESSURE: 107 MMHG

## 2021-01-06 VITALS — DIASTOLIC BLOOD PRESSURE: 91 MMHG | SYSTOLIC BLOOD PRESSURE: 143 MMHG

## 2021-01-06 VITALS — DIASTOLIC BLOOD PRESSURE: 57 MMHG | SYSTOLIC BLOOD PRESSURE: 99 MMHG

## 2021-01-06 VITALS — SYSTOLIC BLOOD PRESSURE: 172 MMHG | DIASTOLIC BLOOD PRESSURE: 112 MMHG

## 2021-01-06 VITALS — DIASTOLIC BLOOD PRESSURE: 103 MMHG | SYSTOLIC BLOOD PRESSURE: 169 MMHG

## 2021-01-06 VITALS — DIASTOLIC BLOOD PRESSURE: 95 MMHG | SYSTOLIC BLOOD PRESSURE: 149 MMHG

## 2021-01-06 VITALS — DIASTOLIC BLOOD PRESSURE: 66 MMHG | SYSTOLIC BLOOD PRESSURE: 109 MMHG

## 2021-01-06 VITALS — DIASTOLIC BLOOD PRESSURE: 112 MMHG | SYSTOLIC BLOOD PRESSURE: 177 MMHG

## 2021-01-06 RX ADMIN — METHYLPHENIDATE HYDROCHLORIDE SCH MG: 10 TABLET ORAL at 16:51

## 2021-01-06 RX ADMIN — BUDESONIDE AND FORMOTEROL FUMARATE DIHYDRATE SCH MG: 160; 4.5 AEROSOL RESPIRATORY (INHALATION) at 09:43

## 2021-01-06 RX ADMIN — TIZANIDINE SCH MG: 2 TABLET ORAL at 12:44

## 2021-01-06 RX ADMIN — TRAZODONE HYDROCHLORIDE SCH MG: 100 TABLET, FILM COATED ORAL at 20:47

## 2021-01-06 RX ADMIN — SODIUM CHLORIDE, PRESERVATIVE FREE SCH ML: 5 INJECTION INTRAVENOUS at 07:57

## 2021-01-06 RX ADMIN — METHYLPHENIDATE HYDROCHLORIDE SCH MG: 10 TABLET ORAL at 20:47

## 2021-01-06 RX ADMIN — TIZANIDINE SCH MG: 2 TABLET ORAL at 20:47

## 2021-01-06 RX ADMIN — FLUOXETINE HYDROCHLORIDE SCH MG: 20 CAPSULE ORAL at 08:20

## 2021-01-06 RX ADMIN — CEFAZOLIN SODIUM SCH MLS/HR: 1 SOLUTION INTRAVENOUS at 09:43

## 2021-01-06 RX ADMIN — DOCUSATE SODIUM 50MG AND SENNOSIDES 8.6MG SCH TAB: 8.6; 5 TABLET, FILM COATED ORAL at 08:23

## 2021-01-06 RX ADMIN — SODIUM CHLORIDE AND POTASSIUM CHLORIDE SCH MLS/HR: .9; .15 SOLUTION INTRAVENOUS at 13:07

## 2021-01-06 RX ADMIN — HYDROCODONE BITARTRATE AND ACETAMINOPHEN PRN TAB: 10; 325 TABLET ORAL at 12:44

## 2021-01-06 RX ADMIN — ONDANSETRON PRN MG: 2 INJECTION, SOLUTION INTRAMUSCULAR; INTRAVENOUS at 08:58

## 2021-01-06 RX ADMIN — METHYLPHENIDATE HYDROCHLORIDE SCH MG: 10 TABLET ORAL at 10:36

## 2021-01-06 RX ADMIN — SODIUM CHLORIDE, PRESERVATIVE FREE SCH ML: 5 INJECTION INTRAVENOUS at 20:47

## 2021-01-06 RX ADMIN — CEFAZOLIN SODIUM SCH MLS/HR: 1 SOLUTION INTRAVENOUS at 01:09

## 2021-01-06 RX ADMIN — TIZANIDINE SCH MG: 2 TABLET ORAL at 05:00

## 2021-01-06 RX ADMIN — HYDROCODONE BITARTRATE AND ACETAMINOPHEN PRN TAB: 10; 325 TABLET ORAL at 19:33

## 2021-01-07 VITALS — DIASTOLIC BLOOD PRESSURE: 65 MMHG | SYSTOLIC BLOOD PRESSURE: 115 MMHG

## 2021-01-07 VITALS — SYSTOLIC BLOOD PRESSURE: 108 MMHG | DIASTOLIC BLOOD PRESSURE: 62 MMHG

## 2021-01-07 RX ADMIN — HYDROCODONE BITARTRATE AND ACETAMINOPHEN PRN TAB: 10; 325 TABLET ORAL at 05:12

## 2021-01-07 RX ADMIN — SODIUM CHLORIDE, PRESERVATIVE FREE SCH ML: 5 INJECTION INTRAVENOUS at 09:00

## 2021-01-07 RX ADMIN — TIZANIDINE SCH MG: 2 TABLET ORAL at 05:10

## 2021-01-07 RX ADMIN — SODIUM CHLORIDE AND POTASSIUM CHLORIDE SCH MLS/HR: .9; .15 SOLUTION INTRAVENOUS at 02:16

## 2021-01-07 RX ADMIN — BUDESONIDE AND FORMOTEROL FUMARATE DIHYDRATE SCH MG: 160; 4.5 AEROSOL RESPIRATORY (INHALATION) at 09:00

## 2021-01-07 RX ADMIN — DOCUSATE SODIUM 50MG AND SENNOSIDES 8.6MG SCH TAB: 8.6; 5 TABLET, FILM COATED ORAL at 09:10

## 2021-01-07 RX ADMIN — METHYLPHENIDATE HYDROCHLORIDE SCH MG: 10 TABLET ORAL at 09:09

## 2021-01-07 RX ADMIN — HYDROCODONE BITARTRATE AND ACETAMINOPHEN PRN TAB: 10; 325 TABLET ORAL at 09:09

## 2021-01-07 RX ADMIN — FLUOXETINE HYDROCHLORIDE SCH MG: 20 CAPSULE ORAL at 09:09

## 2021-01-08 ENCOUNTER — HOSPITAL ENCOUNTER (EMERGENCY)
Facility: MEDICAL CENTER | Age: 71
End: 2021-01-08
Attending: EMERGENCY MEDICINE
Payer: MEDICARE

## 2021-01-08 ENCOUNTER — APPOINTMENT (OUTPATIENT)
Dept: RADIOLOGY | Facility: MEDICAL CENTER | Age: 71
End: 2021-01-08
Attending: EMERGENCY MEDICINE
Payer: MEDICARE

## 2021-01-08 VITALS
HEART RATE: 100 BPM | RESPIRATION RATE: 16 BRPM | SYSTOLIC BLOOD PRESSURE: 167 MMHG | DIASTOLIC BLOOD PRESSURE: 97 MMHG | HEIGHT: 61 IN | BODY MASS INDEX: 19.45 KG/M2 | OXYGEN SATURATION: 96 % | TEMPERATURE: 98.3 F | WEIGHT: 103 LBS

## 2021-01-08 DIAGNOSIS — Z98.890 PERSONAL HISTORY OF SPINE SURGERY: ICD-10-CM

## 2021-01-08 DIAGNOSIS — S40.012A CONTUSION OF LEFT SHOULDER, INITIAL ENCOUNTER: ICD-10-CM

## 2021-01-08 DIAGNOSIS — R29.898 UPPER EXTREMITY WEAKNESS: ICD-10-CM

## 2021-01-08 DIAGNOSIS — S22.32XA CLOSED FRACTURE OF ONE RIB OF LEFT SIDE, INITIAL ENCOUNTER: ICD-10-CM
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
## 2021-01-08 LAB
ALBUMIN SERPL BCP-MCNC: 4 G/DL (ref 3.2–4.9)
ALBUMIN/GLOB SERPL: 1.4 G/DL
ALP SERPL-CCNC: 68 U/L (ref 30–99)
ALT SERPL-CCNC: 14 U/L (ref 2–50)
ANION GAP SERPL CALC-SCNC: 11 MMOL/L (ref 7–16)
AST SERPL-CCNC: 28 U/L (ref 12–45)
BASOPHILS # BLD AUTO: 0.4 % (ref 0–1.8)
BASOPHILS # BLD: 0.03 K/UL (ref 0–0.12)
BILIRUB SERPL-MCNC: 0.4 MG/DL (ref 0.1–1.5)
BLOOD CULTURE HOLD CXBCH: NORMAL
BUN SERPL-MCNC: 16 MG/DL (ref 8–22)
CALCIUM SERPL-MCNC: 9.4 MG/DL (ref 8.5–10.5)
CHLORIDE SERPL-SCNC: 98 MMOL/L (ref 96–112)
CK SERPL-CCNC: 695 U/L (ref 0–154)
CO2 SERPL-SCNC: 26 MMOL/L (ref 20–33)
CREAT SERPL-MCNC: 0.58 MG/DL (ref 0.5–1.4)
EKG IMPRESSION: NORMAL
EOSINOPHIL # BLD AUTO: 0.03 K/UL (ref 0–0.51)
EOSINOPHIL NFR BLD: 0.4 % (ref 0–6.9)
ERYTHROCYTE [DISTWIDTH] IN BLOOD BY AUTOMATED COUNT: 50.3 FL (ref 35.9–50)
GLOBULIN SER CALC-MCNC: 2.9 G/DL (ref 1.9–3.5)
GLUCOSE SERPL-MCNC: 81 MG/DL (ref 65–99)
HCT VFR BLD AUTO: 41.6 % (ref 37–47)
HGB BLD-MCNC: 13.5 G/DL (ref 12–16)
IMM GRANULOCYTES # BLD AUTO: 0.02 K/UL (ref 0–0.11)
IMM GRANULOCYTES NFR BLD AUTO: 0.3 % (ref 0–0.9)
LYMPHOCYTES # BLD AUTO: 2.34 K/UL (ref 1–4.8)
LYMPHOCYTES NFR BLD: 30 % (ref 22–41)
MCH RBC QN AUTO: 33.4 PG (ref 27–33)
MCHC RBC AUTO-ENTMCNC: 32.5 G/DL (ref 33.6–35)
MCV RBC AUTO: 103 FL (ref 81.4–97.8)
MONOCYTES # BLD AUTO: 1.21 K/UL (ref 0–0.85)
MONOCYTES NFR BLD AUTO: 15.5 % (ref 0–13.4)
NEUTROPHILS # BLD AUTO: 4.18 K/UL (ref 2–7.15)
NEUTROPHILS NFR BLD: 53.4 % (ref 44–72)
NRBC # BLD AUTO: 0 K/UL
NRBC BLD-RTO: 0 /100 WBC
PLATELET # BLD AUTO: 223 K/UL (ref 164–446)
PMV BLD AUTO: 8.5 FL (ref 9–12.9)
POTASSIUM SERPL-SCNC: 4 MMOL/L (ref 3.6–5.5)
PROT SERPL-MCNC: 6.9 G/DL (ref 6–8.2)
RBC # BLD AUTO: 4.04 M/UL (ref 4.2–5.4)
SODIUM SERPL-SCNC: 135 MMOL/L (ref 135–145)
WBC # BLD AUTO: 7.8 K/UL (ref 4.8–10.8)

## 2021-01-08 PROCEDURE — 72158 MRI LUMBAR SPINE W/O & W/DYE: CPT

## 2021-01-08 PROCEDURE — 72157 MRI CHEST SPINE W/O & W/DYE: CPT

## 2021-01-08 PROCEDURE — 82550 ASSAY OF CK (CPK): CPT

## 2021-01-08 PROCEDURE — 96375 TX/PRO/DX INJ NEW DRUG ADDON: CPT

## 2021-01-08 PROCEDURE — 700101 HCHG RX REV CODE 250: Performed by: EMERGENCY MEDICINE

## 2021-01-08 PROCEDURE — 73030 X-RAY EXAM OF SHOULDER: CPT | Mod: LT

## 2021-01-08 PROCEDURE — 700105 HCHG RX REV CODE 258: Performed by: EMERGENCY MEDICINE

## 2021-01-08 PROCEDURE — 700117 HCHG RX CONTRAST REV CODE 255: Performed by: EMERGENCY MEDICINE

## 2021-01-08 PROCEDURE — 700111 HCHG RX REV CODE 636 W/ 250 OVERRIDE (IP): Performed by: EMERGENCY MEDICINE

## 2021-01-08 PROCEDURE — 72156 MRI NECK SPINE W/O & W/DYE: CPT

## 2021-01-08 PROCEDURE — A9576 INJ PROHANCE MULTIPACK: HCPCS | Performed by: EMERGENCY MEDICINE

## 2021-01-08 PROCEDURE — 85025 COMPLETE CBC W/AUTO DIFF WBC: CPT

## 2021-01-08 PROCEDURE — 96374 THER/PROPH/DIAG INJ IV PUSH: CPT

## 2021-01-08 PROCEDURE — 70553 MRI BRAIN STEM W/O & W/DYE: CPT

## 2021-01-08 PROCEDURE — 71045 X-RAY EXAM CHEST 1 VIEW: CPT

## 2021-01-08 PROCEDURE — 80053 COMPREHEN METABOLIC PANEL: CPT

## 2021-01-08 PROCEDURE — 36415 COLL VENOUS BLD VENIPUNCTURE: CPT

## 2021-01-08 PROCEDURE — 99284 EMERGENCY DEPT VISIT MOD MDM: CPT

## 2021-01-08 PROCEDURE — 93005 ELECTROCARDIOGRAM TRACING: CPT | Performed by: EMERGENCY MEDICINE

## 2021-01-08 RX ORDER — LIDOCAINE 50 MG/G
1 PATCH TOPICAL EVERY 24 HOURS
Qty: 10 PATCH | Refills: 0 | Status: SHIPPED | OUTPATIENT
Start: 2021-01-08

## 2021-01-08 RX ORDER — HYDROMORPHONE HYDROCHLORIDE 1 MG/ML
0.5 INJECTION, SOLUTION INTRAMUSCULAR; INTRAVENOUS; SUBCUTANEOUS ONCE
Status: COMPLETED | OUTPATIENT
Start: 2021-01-08 | End: 2021-01-08

## 2021-01-08 RX ORDER — LIDOCAINE 50 MG/G
1 PATCH TOPICAL EVERY 24 HOURS
Status: DISCONTINUED | OUTPATIENT
Start: 2021-01-08 | End: 2021-01-09 | Stop reason: HOSPADM

## 2021-01-08 RX ORDER — SODIUM CHLORIDE 9 MG/ML
1000 INJECTION, SOLUTION INTRAVENOUS ONCE
Status: COMPLETED | OUTPATIENT
Start: 2021-01-08 | End: 2021-01-08

## 2021-01-08 RX ORDER — METHYLPREDNISOLONE 4 MG/1
TABLET ORAL
Qty: 1 EACH | Refills: 0 | Status: SHIPPED | OUTPATIENT
Start: 2021-01-08 | End: 2022-10-10

## 2021-01-08 RX ADMIN — GADOTERIDOL 10 ML: 279.3 INJECTION, SOLUTION INTRAVENOUS at 19:45

## 2021-01-08 RX ADMIN — HYDROMORPHONE HYDROCHLORIDE 0.5 MG: 1 INJECTION, SOLUTION INTRAMUSCULAR; INTRAVENOUS; SUBCUTANEOUS at 20:38

## 2021-01-08 RX ADMIN — SODIUM CHLORIDE 1000 ML: 9 INJECTION, SOLUTION INTRAVENOUS at 17:48

## 2021-01-08 RX ADMIN — LIDOCAINE 1 PATCH: 50 PATCH CUTANEOUS at 21:30

## 2021-01-08 NOTE — ED TRIAGE NOTES
"Chief Complaint   Patient presents with   • Sent by MD     Neurosurgery asked pt to come to ER for MRI, neck surgery X 3 for herniated discs most recent 11/23, pt lost ROM to LUE after surgery with very little improvement.    • Extremity Weakness     LUE/LLE s/p surgery, pt denies pain.   • GLF     Pt states she lost her balance this morning and fell, denies injury, denies LOC. Pt not able to get off floor for 1 hour r/t weakness. Pt states some loss of balance since surgery, no other recent falls.      Pt arrives by self in wheelchair for above complaints. VSS on RA, GCS 15, NAD. Pt states some recent confusion but is alert/oriented at triage.     Pt returned to lobby. Educated on triage process and to inform staff of any changes.     Denies all s/sx of covid, denies recent travel, denies fevers.    /88   Pulse (!) 107   Temp 36.7 °C (98 °F) (Temporal)   Resp 16   Ht 1.549 m (5' 1\")   Wt 46.7 kg (103 lb)   SpO2 95%   BMI 19.46 kg/m²      "

## 2021-01-09 NOTE — DISCHARGE INSTRUCTIONS
You were seen and evaluated in the Emergency Department at [facility] for:     Evaluation of extremity weakness after falls in the setting of recent spine surgery.    You had the following tests and studies:    Thankfully your work-up today is reassuring, your MRI does not show any dangerous injuries.  You do have existing disc disease.    Unfortunately, you do have at least one rib fracture on the left side.  This puts you at high risk for getting pneumonia so if you have any worsening chest pain or shortness of breath or fevers need to come back to the ER immediately.  We will prescribe lidocaine patches to place over your sites of chest wall pain, and please use the incentive spirometer provided use this every 20 minutes when you are awake to prevent getting pneumonia.    You received the following medications:    IV fluids, pain medicine.    You received the following prescriptions:    Medrol Dosepak, take as prescribed.  ----------------------------    Please make sure to follow up with:    Your neurosurgeon next week for recheck and definitive care, but if you have any worsening pain or weakness or recurrent falls or any other new or worse symptoms please come back to the ER immediately.    Good luck, we hope you get better soon!  ----------------------------    We always encourage patients to return IMMEDIATELY if they have:  Increased or changing pain, passing out, fevers over 100.4 (taken in your mouth or rectally) for more than 2 days, redness or swelling of skin or tissues, feeling like your heart is beating fast, chest pain that is new or worsening, trouble breathing, feeling like your throat is closing up and can not breath, inability to walk, weakness of any part of your body, new dizziness, severe bleeding that won't stop from any part of your body, if you can't eat or drink, or if you have any other concerns.   If you feel worse, please know that you can always return with any questions,  concerns, worse symptoms, or you are feeling unsafe. We certainly cannot say for sure that we have ruled out every illness or dangerous disease, but we feel that at this specific time, your exam, tests, and vital signs like heart rate and blood pressure are safe for discharge.

## 2021-01-09 NOTE — ED NOTES
Pt ambulated to restroom w/ steady gait and standby assistance. Pt requesting something for pain. Notified Dr. Addison.

## 2021-01-09 NOTE — ED PROVIDER NOTES
ED Provider Note    CHIEF COMPLAINT  Chief Complaint   Patient presents with   • Sent by MD     Neurosurgery asked pt to come to ER for MRI, neck surgery X 3 for herniated discs most recent 11/23, pt lost ROM to LUE after surgery with very little improvement.    • Extremity Weakness     LUE/LLE s/p surgery, pt denies pain.   • GLF     Pt states she lost her balance this morning and fell, denies injury, denies LOC. Pt not able to get off floor for 1 hour r/t weakness. Pt states some loss of balance since surgery, no other recent falls.        HPI    Primary care provider: Chel Bryan P.A.-C.  Means of arrival: POV driven in by friend  History obtained from: Patient  History limited by: Nothing    Angie Dover is a 70 y.o. female who presents with evaluation after 2 falls today.  The patient was in the bathroom and felt like she lost her balance and fell to the ground.  This is never happened before.  She denies losing consciousness.  Does not think she hit her head.  However, she has had multiple neck surgeries.  She has had decreased range of motion and weakness to her left arm and leg since her last neck surgery, denies any changes in her sensation or strength today.  She denies a headache.  No vision changes.  No fevers.  No significant pain to her neck or back.  She has decreased range of motion of her neck which is stable since her last surgery.  Denies any chest pain cough fevers dyspnea or known Covid contact.  No alleviating measures attempted.  She fell in the bathroom and had some difficulty getting up on her own, slowly was able to use her self up but then fell again.  Denies any abdominal pain or hip or leg or arm pain.  She let her neurosurgeon know this happened and he apparently told her to come to the ER for an MRI.  Follows with Dr. Barr, neurosurgery.    REVIEW OF SYSTEMS  Constitutional: Negative for fever or chills.   HENT: Negative for rhinorrhea or headache or sore throat.    Eyes:  Negative for double or blurry vision  Respiratory: Negative for cough or shortness of breath.    Cardiovascular: Negative for chest pain or syncope.   Gastrointestinal: Negative for nausea, vomiting, or abdominal pain.   Genitourinary: Negative for dysuria or flank pain.  Positive for increased urinary frequency, this is been present for months.  Musculoskeletal: Negative for back pain or joint pain.   Skin: Negative for itching or rash.   Neurological: Negative for sensory changes.  Positive for stable subjective weakness in the left arm and leg.  See HPI for further details. All other systems are negative.     PAST MEDICAL HISTORY   has a past medical history of Arthritis, Bronchitis (2018), Cataract, Heart burn, Indigestion, and Urinary incontinence.    PAST FAMILY HISTORY  No pertinent past family history.    SOCIAL HISTORY  Social History     Tobacco Use   • Smoking status: Former Smoker     Years: 15.00     Quit date: 2017     Years since quittin.0   • Smokeless tobacco: Never Used   Substance and Sexual Activity   • Alcohol use: Yes     Comment: occasionally   • Drug use: Never   • Sexual activity: Not on file       SURGICAL HISTORY   has a past surgical history that includes other neurological surg (2019); other orthopedic surgery (Right, 2017); other; other; and bunionectomy (Right, 2020).    CURRENT MEDICATIONS  Home Medications     Reviewed by Thomas Greene R.N. (Registered Nurse) on 21 at 1519  Med List Status: Partial   Medication Last Dose Status   acetaminophen (TYLENOL) 500 MG Tab  Active   ALPRAZolam (XANAX) 0.5 MG Tab  Active   baclofen (LIORESAL) 10 MG Tab  Active   Cholecalciferol (VITAMIN D3 PO)  Active   fluoxetine (PROZAC) 40 MG capsule  Active   Lurasidone HCl (LATUDA) 60 MG Tab  Active   methylphenidate (RITALIN) 20 MG tablet  Active   Mirabegron ER (MYRBETRIQ) 50 MG TABLET SR 24 HR  Active   trazodone (DESYREL) 100 MG Tab  Active                ALLERGIES  Allergies  "  Allergen Reactions   • Morphine Unspecified     Pt. States it gives her Migraine's    • Penicillins Swelling     Pt. States swelling of the mouth    • Sulfa Drugs Swelling     Pt. States mouth swelling        PHYSICAL EXAM  VITAL SIGNS: BP (!) 167/97   Pulse 100   Temp 36.8 °C (98.3 °F) (Temporal)   Resp 16   Ht 1.549 m (5' 1\")   Wt 46.7 kg (103 lb)   SpO2 96%   BMI 19.46 kg/m²    Pulse ox interpretation: On room air I interpret this pulse ox as normal.  Constitutional: Chronically ill-appearing very thin female sitting up in mild distress.  HEENT: Normocephalic, atraumatic. Posterior pharynx clear, mucous membranes dry.  Eyes:  EOMI. Normal sclerae.  Neck: Surgical site with Steri-Strips over her posterior neck are clean dry and intact without erythema or drainage or significant step-off.  Limited range of motion since her surgery reportedly unchanged.  Chest/Pulmonary: Diminished to ausculation bilaterally, no wheezes or rhonchi.  Cardiovascular: Tachycardic rate, regular rhythm, no obvious murmur.   Abdomen: Soft, nontender; no rebound, guarding, or masses.  Back: No CVA or midline tenderness.   Musculoskeletal: No deformity or edema.  Neuro: 4-5 strength in left upper extremity, good , normal sensation, 5 out of 5 strength in both legs.  Psych: Flat affect but cooperative.  Skin: No rashes, warm and dry.  See neck exam for neck surgical site description.      DIAGNOSTIC STUDIES / PROCEDURES    LABS & EKG  Results for orders placed or performed during the hospital encounter of 01/08/21   CBC WITH DIFFERENTIAL   Result Value Ref Range    WBC 7.8 4.8 - 10.8 K/uL    RBC 4.04 (L) 4.20 - 5.40 M/uL    Hemoglobin 13.5 12.0 - 16.0 g/dL    Hematocrit 41.6 37.0 - 47.0 %    .0 (H) 81.4 - 97.8 fL    MCH 33.4 (H) 27.0 - 33.0 pg    MCHC 32.5 (L) 33.6 - 35.0 g/dL    RDW 50.3 (H) 35.9 - 50.0 fL    Platelet Count 223 164 - 446 K/uL    MPV 8.5 (L) 9.0 - 12.9 fL    Neutrophils-Polys 53.40 44.00 - 72.00 %    " Lymphocytes 30.00 22.00 - 41.00 %    Monocytes 15.50 (H) 0.00 - 13.40 %    Eosinophils 0.40 0.00 - 6.90 %    Basophils 0.40 0.00 - 1.80 %    Immature Granulocytes 0.30 0.00 - 0.90 %    Nucleated RBC 0.00 /100 WBC    Neutrophils (Absolute) 4.18 2.00 - 7.15 K/uL    Lymphs (Absolute) 2.34 1.00 - 4.80 K/uL    Monos (Absolute) 1.21 (H) 0.00 - 0.85 K/uL    Eos (Absolute) 0.03 0.00 - 0.51 K/uL    Baso (Absolute) 0.03 0.00 - 0.12 K/uL    Immature Granulocytes (abs) 0.02 0.00 - 0.11 K/uL    NRBC (Absolute) 0.00 K/uL   CMP   Result Value Ref Range    Sodium 135 135 - 145 mmol/L    Potassium 4.0 3.6 - 5.5 mmol/L    Chloride 98 96 - 112 mmol/L    Co2 26 20 - 33 mmol/L    Anion Gap 11.0 7.0 - 16.0    Glucose 81 65 - 99 mg/dL    Bun 16 8 - 22 mg/dL    Creatinine 0.58 0.50 - 1.40 mg/dL    Calcium 9.4 8.5 - 10.5 mg/dL    AST(SGOT) 28 12 - 45 U/L    ALT(SGPT) 14 2 - 50 U/L    Alkaline Phosphatase 68 30 - 99 U/L    Total Bilirubin 0.4 0.1 - 1.5 mg/dL    Albumin 4.0 3.2 - 4.9 g/dL    Total Protein 6.9 6.0 - 8.2 g/dL    Globulin 2.9 1.9 - 3.5 g/dL    A-G Ratio 1.4 g/dL   CREATINE KINASE   Result Value Ref Range    CPK Total 695 (H) 0 - 154 U/L   ESTIMATED GFR   Result Value Ref Range    GFR If African American >60 >60 mL/min/1.73 m 2    GFR If Non African American >60 >60 mL/min/1.73 m 2   Blood Culture,Hold   Result Value Ref Range    Blood Culture Hold Collected    EKG   Result Value Ref Range    Report       [facility] Emergency Dept.    Test Date:  2021  Pt Name:    AGNES RAE                 Department: ER  MRN:        [id number]                      Room:       [facility]  Gender:     Female                       Technician: [id number]  :        [id number]                   Requested By:KRISTIAN WOODS  Order #:    [id number]                    Reading MD: Kristian Woods MD    Measurements  Intervals                                Axis  Rate:       98                           P:          75  CO:         140                           QRS:        64  QRSD:       76                           T:          59  QT:         364  QTc:        465    Interpretive Statements  SINUS RHYTHM  LEFT ATRIAL ABNORMALITY  PROBABLE LEFT VENTRICULAR HYPERTROPHY  No previous ECG available for comparison  No STEMI or strain or dysrhythmia  Electronically Signed On 1-8-2021 22:39:22 PST by Kristian Addison MD         RADIOLOGY  DX-SHOULDER 2+ LEFT   Final Result      Negative LEFT shoulder series.      DX-CHEST-PORTABLE (1 VIEW)   Final Result      No acute cardiac or pulmonary abnormality is noted.      MR-CERVICAL SPINE-WITH & W/O    (Results Pending)   MR-THORACIC SPINE-WITH & W/O    (Results Pending)   MR-LUMBAR SPINE-WITH & W/O    (Results Pending)   MR-BRAIN-WITH & W/O    (Results Pending)       COURSE & MEDICAL DECISION MAKING    This is a 70 y.o. female who presents with 2 ground-level falls at home today.  No loss of consciousness.  Sent in by neurosurgeon since she had recent neck surgery.    Differential Diagnosis includes but is not limited to:  Surgical complication, epidural bleed, disc disease, spinal compression, stroke, dysrhythmia, dehydration, anemia    ED Course:  This is a 70-year-old female who had some transient acute on chronic left upper and lower extremity weakness after falling at home today.  Discussed with her neurosurgeon who sent her in for emergent MRI.  I will screen with labs EKG and imaging.  I did discuss the case with the patient's neurosurgeon Dr. DONIS Barr, he agrees with plan for immediate imaging and if stable plan discharge.    MRI of the neural axis obtained because the patient has numerous complaints she has had some stuttering increased urinary frequency for months, occasional left leg weakness now with left upper extremity weakness now with a fall and so this could be anywhere along her neural column, so I will MRI her entire spine and brain.  Labs show a slightly elevated CK she was stuck on the  floor for some time but she has no active vomiting, I will give her a crystalloid bolus in case she has early mild rhabdo but there is no evidence of renal injury.  She is also receiving IV contrast for the MRIs.  No critical anemia.    Discussed MR imaging with overnight radiologist Dr. Gonzales, he sees nothing acute.  I again consulted with neurosurgeon Dr. DONIS Barr, he personally reviewed the patient's imaging and also sees nothing life or limb threatening.  He requests I prescribed the patient a Medrol Dosepak for this week and he will follow up with her outpatient.  The patient is very insistent to go home as I was discussing discharge instructions and return precautions she complained of some left lateral chest wall pain and she has some small bruising there as well as bruising over her shoulder, portable x-rays obtained immediately nothing acute per radiologist I think I see a subtle left-sided rib fractures so I will have the nurse apply lidocaine patch, the patient can pull well with an incentive spirometer and has no signs of pneumothorax.  She has no abdominal pain or tenderness she can bear weight without difficulty doubt pelvic fracture.  She wishes to go home I was considering further work-up but she declines any more at this time.  Incentive spirometer provided, nothing acute on shoulder x-ray, follow-up with neurosurgeon next week, return if any worse.    Medications   lidocaine (LIDODERM) 5 % 1 Patch (1 Patch Transdermal Patch Applied 1/8/21 2130)   NS infusion 1,000 mL (0 mL Intravenous Stopped 1/8/21 2028)   gadoteridol (PROHANCE) injection 10 mL (10 mL Intravenous Given 1/8/21 1945)   HYDROmorphone pf (DILAUDID) injection 0.5 mg (0.5 mg Intravenous Given 1/8/21 2038)       FINAL IMPRESSION  1. Fall, initial encounter    2. Upper extremity weakness    3. Closed fracture of one rib of left side, initial encounter    4. Personal history of spine surgery    5. Contusion of left shoulder, initial  encounter        PRESCRIPTIONS  Discharge Medication List as of 1/8/2021  9:52 PM      START taking these medications    Details   methylPREDNISolone (MEDROL DOSEPAK) 4 MG Tablet Therapy Pack Use as directed, Disp-1 Each, R-0, Print Rx Paper      lidocaine (LIDODERM) 5 % Patch Place 1 Patch on the skin every 24 hours., Disp-10 Patch, R-0, Print Rx Paper             FOLLOW UP  [facility] Emergency Dept  [address] [facility] [id number] [phone]  Today  If you have ANY new or worse symptoms!    Kiran Barr M.D.  [address] [address]  [phone]    Schedule an appointment as soon as possible for a visit in 1 week  for recheck with your surgeon      -DISCHARGE-       Results, exam findings, clinical impression, presumed diagnosis, treatment options, and strict return precautions were discussed with the patient, and they verbalized understanding, agreed with, and appreciated the plan of care.    Pertinent Labs & Imaging studies reviewed and verified by myself, as well as nursing notes and the patient's past medical, family, and social histories (See chart for details).    Portions of this record were made with voice recognition software.  Despite my review, spelling/grammar/context errors may still remain.  Interpretation of this chart should be taken in this context.    Electronically signed by Kristian Addison M.D. on 1/8/2021 at 10:42 PM.

## 2021-01-09 NOTE — ED NOTES
X-ray completed. Pt requesting to leave. Educated the need to wait for radiology to read x-ray. Pt refusing to stay. Educated about IS pt at 500. Pt refused lidocaine patch. Notified Dr. Addison

## 2021-01-09 NOTE — ED NOTES
Pt discharged. Pt provided information about fall prevention and rib fracture. Pt educated to follow-up w/ neurosurgeon, PCP, and to return to the hospital w/ any worsening symptoms. Pt provided IS and prescription. Pt walked to the lobby and driven home by friend.

## 2021-01-29 ENCOUNTER — APPOINTMENT (RX ONLY)
Dept: URBAN - NONMETROPOLITAN AREA CLINIC 1 | Facility: CLINIC | Age: 71
Setting detail: DERMATOLOGY
End: 2021-01-29

## 2021-01-29 DIAGNOSIS — Z41.9 ENCOUNTER FOR PROCEDURE FOR PURPOSES OTHER THAN REMEDYING HEALTH STATE, UNSPECIFIED: ICD-10-CM

## 2021-01-29 PROCEDURE — ? FILLERS

## 2021-01-29 PROCEDURE — ? BOTOX

## 2021-01-29 PROCEDURE — ? ADDITIONAL NOTES

## 2021-01-29 NOTE — PROCEDURE: BOTOX
Lot #: [id number]
Post-Care Instructions: Patient instructed to not lie down for 4 hours and limit physical activity for 24 hours. RN recommends topical arnica and/or ice if bruising presents.\\n\\nPt instructed to contact the clinic with any questions, concerns, or post treatment complications, ie: the pt feels the product did not work for them, etc.  \\n\\Paty pt concerns and questions addressed and pt verbalized understanding.\\n\\n16 units complimentary, product provided by [employer].
Periorbital Skin Units: 8
Anterior Platysmal Bands Units: 0
Price (Use Numbers Only, No Special Characters Or $): 48
Dilution (U/0.1 Cc): 4
Detail Level: Detailed
Additional Area 1 Location: bunnies bridge of nose
Consent: Written consent reviewed by RN and signed by pt. Risks include but not limited to lid/brow ptosis, bruising, swelling, diplopia, temporary effect, incomplete chemical denervation.  \\nPt denies pregnancy. \\nIf pt is breastfeeding, they are counseled to dispose of breast milk for 24 hours post botox injection. \\nPt denies current administration of anti-biotics. \\nPt denies allergy to albumin or lactose. \\nPt's taking blood thinners are counseled on the increased risk of bleeding and bruising at the injection site.
Forehead Units: 6

## 2021-01-29 NOTE — PROCEDURE: FILLERS
Additional Area 4 Volume In Cc: 0
Detail Level: Detailed
Topical Anesthesia?: 23% lidocaine, 7% tetracaine
Mid Face Filler Volume In Cc: 0.4
Post-Care Instructions: Patient instructed to apply ice to reduce swelling. RN also recommends Arnica gel to reduce bruising. Pt instructed to contact the provider if complications arise.
Lot #: [id number]
Filler: Juvederm Ultra
Consent: Pt swished with antiseptic mouthwash for 30 seconds prior to treatment. \\n\\nWritten consent reviewed by RN and signed by pt. \\nRisks include but not limited to bruising, bleeding, irregular texture, ulceration, infection, allergic reaction, scar formation, incomplete augmentation, temporary nature, procedural pain, and vessel occlusion.  \\n\\Paty pt concerns and questions addressed, pt verbalized understanding.
Include Cannula Information In Note?: No
Price (Use Numbers Only, No Special Characters Or $): 199
Nasolabial Folds Filler Volume In Cc: 0.2
Map Statment: See Attach Map for Complete Details

## 2021-01-30 VITALS — TEMPERATURE: 97.1 F

## 2021-02-04 ENCOUNTER — APPOINTMENT (RX ONLY)
Dept: URBAN - NONMETROPOLITAN AREA CLINIC 1 | Facility: CLINIC | Age: 71
Setting detail: DERMATOLOGY
End: 2021-02-04

## 2021-02-04 VITALS — TEMPERATURE: 97 F

## 2021-02-04 DIAGNOSIS — L98.8 OTHER SPECIFIED DISORDERS OF THE SKIN AND SUBCUTANEOUS TISSUE: ICD-10-CM

## 2021-02-04 PROCEDURE — ? MICRONEEDLING

## 2021-02-04 PROCEDURE — ? ADDITIONAL NOTES

## 2021-02-04 NOTE — PROCEDURE: MICRONEEDLING
Depth In Mm (Location #1): 2
Location #2: Forehead/nose
Speed (Location #1): high
Detail Level: Zone
Length Of Topical Anesthesia Application (Optional): 20 minutes
Depth In Mm (Location #3): 0.5
Location #1: Cheeks/chin/upper lip
Consent: Written consent reviewed by RN and signed by pt.  Risks reviewed including but not limited to pain, swelling, scarring, infection, and incomplete improvement. \\Paty pt concerns and questions addressed and pt verbalized understanding.
Depth In Mm (Location #2): 1
Topical Anesthesia?: 23% lidocaine, 7% tetracaine
Location #3: under eye
Post-Care Instructions: Detailed post care reviewed and pt provided written post care instructions including a detailed list of products to avoid for the first 24 hours post tx.  After the initial 24 hour period, pt should use high quality, gentle moisturizer, and protect the treatment area with sunscreen.  Pt educated to continue use of high quality products and avoid retinoid type products until the skin is fully healed at which time they can return to their normal home care routine.  \\nPt verbalized understanding.
Price (Use Numbers Only, No Special Characters Or $): 417

## 2021-02-04 NOTE — PROCEDURE: ADDITIONAL NOTES
Additional Notes: Skin Pen Microneedling Treatment\\nPre and Post Procedure Instructions\\nPre-Procedure\\nFor best results and minimal downtime, it is recommended that the patient pre-treat their skin twice per day for up to two weeks with Alastin Skin Regenerating Nectar.  The patient will continue to use the Nectar for up to two weeks post treatment for best results.  \\nPrior to procedure precautions: \\n• Avoid excessive sun exposure/sunburns 24 hours prior to procedure\\n• Discontinue use of topical retinoids 72 hours prior to procedure\\n• Allow at least 24 hours after autoimmune therapies before Skin Pen treatment\\n• Wait 6 months following oral isotretinoin use\\n• If you experience cold sores please inform your practitioner; we can provide you with a prescription anti-viral medication to suppress the possibility of an outbreak\\nDuring the Procedure\\n1. The patient’s skin will be prepped with topical numbing agent. \\n2. Patient’s skin will be cleansed and a glide product applied to the skin for treatment. \\n3. Treatment with the microneedling device will take approximately 30 to 45 minutes. \\n4. Alastin Skin Regenerating Nectar will be applied to the skin post treatment. \\nPost Procedure Instructions and Expectations\\n It is important the patient understands and follows the post procedure instructions.  Microneedling creates “micro channels” where the tiny needle has entered and exited the skin creating an injury.  The micro-channels remain open for the first 24 hours post treatment.  During this 24 hour period, it of the upmost importance the patient applies the correct skin care to avoid complications.  If you wish, you may bring your products into the office so we may determine if they are safe for use.\\nListed below are product ingredients which the patient SHOULD NOT APPLY to their skin while the micro-channels are open:\\nProducts to AVOID for the first 24 hours post treatment: \\n• Silicone\\n• Dimethicone\\n• Methicone\\n• Dyes\\n• Fragrance\\n• Petrolatum or Petroleum \\n• Glycol or propylene glycol\\n• Mineral oil\\n• Sulphates, such as Sodium Peyton Sulphates or derivatives\\n• Parabens\\n• Citrus-derived preservatives\\n• Formaldehyde releasing preservatives\\n \\nProducts to consider for optimal results post treatment and are APPROVED for the application in the first 24 hours: \\n• Vitamin C\\n• Vitamin E\\n• Peptides\\n• Hyaluronic Acid\\n• Magnesium\\n• Growth Factors\\nPost Procedure Home Care\\nThe First 24 hours Post Treatment\\n1. WASH with a gentle cleanser and water, morning and night, lightly pat skin dry. \\n2. APPLY only Alastin [place] or other approved skin care for the first 24 hours post procedure. \\n3. MOISTURIZE with a soothing balm or appropriate moisturizer after the Nectar product.  \\na. You may reapply the moisturizer or soothing balm multiple times per day to soothe irritation and promote moisture in the skin. \\n4. AVOID sun for the first 24 hours post treatment.  \\n5. AVOID Strenuous exercise or excessive perspiration 24-48 hours post treatment.  \\na. Excessive blood flow and sweat can cause irritation and discomfort to the treatment area. \\n48 Hours to 2 Weeks Post Treatment\\n1. WASH with a gentle cleanser and water, morning and night, lightly pat skin dry. \\n2. APPLY Alastin [place], morning and night, for up to 2 weeks post treatment or until the skin has fully healed. \\n3. MOISTURIZE with a soothing balm or appropriate moisturizer after the Nectar product.  \\na. You may reapply the moisturizer or soothing balm multiple times per day to soothe irritation and promote moisture in the skin. \\nb. AVOID Home care corrective products, such as Citric Acid, Retinols/Retinoids, Alpha Hydroxy Acids, Beta Hydroxy Acids, scrubs, exfoliating products, cleansing brushes (such as the Clarisonic Brush), or other products considered to have active ingredients should be avoided until the skin is fully healed. \\n4. PROTECT your investment by applying a high quality, physical sunscreen.  \\na. Chemical sunscreens of any kind are not recommended until the skin has fully healed.  \\nb. If sun exposure cannot be avoided, assure you are reapplying the physical sunscreen every 90 minutes and protect the treatment area with clothing (a wide-brimmed hat, a buff, etc.). \\n5. RESUME your normal skin care routine once the skin has fully healed and discontinue the use of the Alastin Skin Regenerating Nectar product. \\nIf you have any questions or concerns about your treatment please contact the [facility] [place] at \\[phone].  Thank you!
Detail Level: Simple
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
## 2021-03-09 ENCOUNTER — APPOINTMENT (RX ONLY)
Dept: URBAN - NONMETROPOLITAN AREA CLINIC 1 | Facility: CLINIC | Age: 71
Setting detail: DERMATOLOGY
End: 2021-03-09

## 2021-03-09 VITALS — TEMPERATURE: 97.2 F

## 2021-03-09 DIAGNOSIS — Z41.9 ENCOUNTER FOR PROCEDURE FOR PURPOSES OTHER THAN REMEDYING HEALTH STATE, UNSPECIFIED: ICD-10-CM

## 2021-03-09 PROCEDURE — ? BOTOX

## 2021-03-09 PROCEDURE — ? ADDITIONAL NOTES

## 2021-03-09 PROCEDURE — ? COSMETIC FOLLOW-UP

## 2021-03-09 NOTE — PROCEDURE: COSMETIC FOLLOW-UP
Detail Level: Zone
Treatment (Optional): Microneedling
Price (Use Numbers Only, No Special Characters Or $): 0
Global Improvement: Very Good
Patient Satisfaction: Pleased

## 2021-03-09 NOTE — PROCEDURE: BOTOX
Additional Area 3 Units: 0
Lot #: [id number]
Glabellar Complex Units: 20
Consent: Written consent reviewed by RN and signed by pt. Risks include but not limited to lid/brow ptosis, bruising, swelling, diplopia, temporary effect, incomplete chemical denervation.  \\nPt denies pregnancy. \\nIf pt is breastfeeding, they are counseled to dispose of breast milk for 24 hours post botox injection. \\nPt denies current administration of anti-biotics. \\nPt denies allergy to albumin or lactose. \\nPt's taking blood thinners are counseled on the increased risk of bleeding and bruising at the injection site.
Additional Area 2 Units: 4
Periorbital Skin Units: 28
Post-Care Instructions: Patient instructed to not lie down for 4 hours and limit physical activity for 24 hours. RN recommends topical arnica and/or ice if bruising presents.\\n\\nPt instructed to contact the clinic with any questions, concerns, or post treatment complications, ie: the pt feels the product did not work for them, etc.  \\n\\Paty pt concerns and questions addressed and pt verbalized understanding.
Additional Area 1 Location: upper lip
Price (Use Numbers Only, No Special Characters Or $): 1001
Detail Level: Detailed
Additional Area 2 Location: Bunnies
Additional Area 1 Units: 8
R Brow Units: 2

## 2021-03-25 ENCOUNTER — APPOINTMENT (RX ONLY)
Dept: URBAN - NONMETROPOLITAN AREA CLINIC 1 | Facility: CLINIC | Age: 71
Setting detail: DERMATOLOGY
End: 2021-03-25

## 2021-03-25 VITALS — TEMPERATURE: 97 F

## 2021-03-25 DIAGNOSIS — Z41.9 ENCOUNTER FOR PROCEDURE FOR PURPOSES OTHER THAN REMEDYING HEALTH STATE, UNSPECIFIED: ICD-10-CM

## 2021-03-25 DIAGNOSIS — H01.13 ECZEMATOUS DERMATITIS OF EYELID: ICD-10-CM

## 2021-03-25 PROBLEM — H01.139 ECZEMATOUS DERMATITIS OF UNSPECIFIED EYE, UNSPECIFIED EYELID: Status: ACTIVE | Noted: 2021-03-25

## 2021-03-25 PROCEDURE — ? COUNSELING

## 2021-03-25 PROCEDURE — 99213 OFFICE O/P EST LOW 20 MIN: CPT

## 2021-03-25 PROCEDURE — ? COSMETIC FOLLOW-UP

## 2021-03-25 PROCEDURE — ? TREATMENT REGIMEN

## 2021-03-25 ASSESSMENT — LOCATION SIMPLE DESCRIPTION DERM: LOCATION SIMPLE: LEFT EYEBROW

## 2021-03-25 ASSESSMENT — LOCATION ZONE DERM: LOCATION ZONE: FACE

## 2021-03-25 ASSESSMENT — LOCATION DETAILED DESCRIPTION DERM: LOCATION DETAILED: LEFT LATERAL EYEBROW

## 2021-03-25 NOTE — PROCEDURE: COSMETIC FOLLOW-UP
Global Improvement: Very Good
Detail Level: Zone
Comments (Free Text): Pt states she is as happy as she ever is but feels the Botox doesn’t last as long as previous treatments.
Price (Use Numbers Only, No Special Characters Or $): 0
Patient Satisfaction: Pleased
Treatment (Optional): Botox

## 2021-04-20 ENCOUNTER — APPOINTMENT (RX ONLY)
Dept: URBAN - NONMETROPOLITAN AREA CLINIC 1 | Facility: CLINIC | Age: 71
Setting detail: DERMATOLOGY
End: 2021-04-20

## 2021-04-20 VITALS — TEMPERATURE: 97.5 F

## 2021-04-20 DIAGNOSIS — L98.8 OTHER SPECIFIED DISORDERS OF THE SKIN AND SUBCUTANEOUS TISSUE: ICD-10-CM

## 2021-04-20 PROCEDURE — ? MICRONEEDLING

## 2021-04-20 PROCEDURE — ? ADDITIONAL NOTES

## 2021-04-20 NOTE — PROCEDURE: ADDITIONAL NOTES
Additional Notes: Skin Pen Microneedling Treatment\\nPre and Post Procedure Instructions\\nPre-Procedure\\nFor best results and minimal downtime, it is recommended that the patient pre-treat their skin twice per day for up to two weeks with Alastin Skin Regenerating Nectar.  The patient will continue to use the Nectar for up to two weeks post treatment for best results.  \\nPrior to procedure precautions: \\n• Avoid excessive sun exposure/sunburns 24 hours prior to procedure\\n• Discontinue use of topical retinoids 72 hours prior to procedure\\n• Allow at least 24 hours after autoimmune therapies before Skin Pen treatment\\n• Wait 6 months following oral isotretinoin use\\n• If you experience cold sores please inform your practitioner; we can provide you with a prescription anti-viral medication to suppress the possibility of an outbreak\\nDuring the Procedure\\n1. The patient’s skin will be prepped with topical numbing agent. \\n2. Patient’s skin will be cleansed and a glide product applied to the skin for treatment. \\n3. Treatment with the microneedling device will take approximately 30 to 45 minutes. \\n4. Alastin Skin Regenerating Nectar will be applied to the skin post treatment. \\nPost Procedure Instructions and Expectations\\n It is important the patient understands and follows the post procedure instructions.  Microneedling creates “micro channels” where the tiny needle has entered and exited the skin creating an injury.  The micro-channels remain open for the first 24 hours post treatment.  During this 24 hour period, it of the upmost importance the patient applies the correct skin care to avoid complications.  If you wish, you may bring your products into the office so we may determine if they are safe for use.\\nListed below are product ingredients which the patient SHOULD NOT APPLY to their skin while the micro-channels are open:\\nProducts to AVOID for the first 24 hours post treatment: \\n• Silicone\\n• Dimethicone\\n• Methicone\\n• Dyes\\n• Fragrance\\n• Petrolatum or Petroleum \\n• Glycol or propylene glycol\\n• Mineral oil\\n• Sulphates, such as Sodium [place]eyton Sulphates or derivatives\\n• Parabens\\n• Citrus-derived preservatives\\n• Formaldehyde releasing preservatives\\n \\nProducts to consider for optimal results post treatment and are APPROVED for the application in the first 24 hours: \\n• Vitamin C\\n• Vitamin E\\n• Peptides\\n• Hyaluronic Acid\\n• Magnesium\\n• Growth Factors\\nPost Procedure Home Care\\nThe First 24 hours Post Treatment\\n1. WASH with a gentle cleanser and water, morning and night, lightly pat skin dry. \\n2. APPLY only Alastin [place] or other approved skin care for the first 24 hours post procedure. \\n3. MOISTURIZE with a soothing balm or appropriate moisturizer after the Nectar product.  \\na. You may reapply the moisturizer or soothing balm multiple times per day to soothe irritation and promote moisture in the skin. \\n4. AVOID sun for the first 24 hours post treatment.  \\n5. AVOID Strenuous exercise or excessive perspiration 24-48 hours post treatment.  \\na. Excessive blood flow and sweat can cause irritation and discomfort to the treatment area. \\n48 Hours to 2 Weeks Post Treatment\\n1. WASH with a gentle cleanser and water, morning and night, lightly pat skin dry. \\n2. APPLY Alastin [place], morning and night, for up to 2 weeks post treatment or until the skin has fully healed. \\n3. MOISTURIZE with a soothing balm or appropriate moisturizer after the Nectar product.  \\na. You may reapply the moisturizer or soothing balm multiple times per day to soothe irritation and promote moisture in the skin. \\nb. AVOID Home care corrective products, such as Citric Acid, Retinols/Retinoids, Alpha Hydroxy Acids, Beta Hydroxy Acids, scrubs, exfoliating products, cleansing brushes (such as the Clarisonic Brush), or other products considered to have active ingredients should be avoided until the skin is fully healed. \\n4. PROTECT your investment by applying a high quality, physical sunscreen.  \\na. Chemical sunscreens of any kind are not recommended until the skin has fully healed.  \\nb. If sun exposure cannot be avoided, assure you are reapplying the physical sunscreen every 90 minutes and protect the treatment area with clothing (a wide-brimmed hat, a buff, etc.). \\n5. RESUME your normal skin care routine once the skin has fully healed and discontinue the use of the Alastin Skin Regenerating Nectar product. \\nIf you have any questions or concerns about your treatment please contact the [facility] office at \\[phone].  Thank you!
Detail Level: Simple

## 2021-04-20 NOTE — PROCEDURE: MICRONEEDLING
Depth In Mm (Location #1): 1.75
Location #2: Forehead/nose
Speed (Location #1): high
Detail Level: Zone
Length Of Topical Anesthesia Application (Optional): 20 minutes
Depth In Mm (Location #3): 0.25
Location #1: Cheeks/chin/upper lip
Consent: Written consent reviewed by RN and signed by pt.  Risks reviewed including but not limited to pain, swelling, scarring, infection, and incomplete improvement. \\Paty pt concerns and questions addressed and pt verbalized understanding.
Depth In Mm (Location #2): 0.75
Topical Anesthesia?: 23% lidocaine, 7% tetracaine
Location #3: under eye
Post-Care Instructions: Detailed post care reviewed and pt provided written post care instructions including a detailed list of products to avoid for the first 24 hours post tx.  After the initial 24 hour period, pt should use high quality, gentle moisturizer, and protect the treatment area with sunscreen.  Pt educated to continue use of high quality products and avoid retinoid type products until the skin is fully healed at which time they can return to their normal home care routine.  \\nPt verbalized understanding.
Price (Use Numbers Only, No Special Characters Or $): 300
Treatment Number (Optional): 3

## 2021-06-10 ENCOUNTER — APPOINTMENT (RX ONLY)
Dept: URBAN - NONMETROPOLITAN AREA CLINIC 1 | Facility: CLINIC | Age: 71
Setting detail: DERMATOLOGY
End: 2021-06-10

## 2021-06-10 DIAGNOSIS — L82.1 OTHER SEBORRHEIC KERATOSIS: ICD-10-CM

## 2021-06-10 DIAGNOSIS — Z41.9 ENCOUNTER FOR PROCEDURE FOR PURPOSES OTHER THAN REMEDYING HEALTH STATE, UNSPECIFIED: ICD-10-CM

## 2021-06-10 PROCEDURE — ? COSMETIC CONSULTATION: FILLERS

## 2021-06-10 PROCEDURE — ? ADDITIONAL NOTES

## 2021-06-10 PROCEDURE — ? BOTOX

## 2021-06-10 PROCEDURE — ? BENIGN DESTRUCTION COSMETIC

## 2021-06-10 PROCEDURE — ? FILLERS

## 2021-06-10 ASSESSMENT — LOCATION DETAILED DESCRIPTION DERM
LOCATION DETAILED: LEFT FOREHEAD
LOCATION DETAILED: RIGHT SUPERIOR LATERAL BUCCAL CHEEK
LOCATION DETAILED: LEFT SUPERIOR MEDIAL FOREHEAD
LOCATION DETAILED: RIGHT FOREHEAD

## 2021-06-10 ASSESSMENT — LOCATION SIMPLE DESCRIPTION DERM
LOCATION SIMPLE: RIGHT FOREHEAD
LOCATION SIMPLE: LEFT FOREHEAD
LOCATION SIMPLE: RIGHT CHEEK

## 2021-06-10 ASSESSMENT — LOCATION ZONE DERM: LOCATION ZONE: FACE

## 2021-06-10 NOTE — PROCEDURE: FILLERS
Decollete Filler Volume In Cc: 0
Include Cannula Information In Note?: No
Post-Care Instructions: Patient instructed to apply ice to reduce swelling. RN also recommends Arnica gel to reduce bruising. Pt instructed to contact the provider if complications arise.
Filler: Juvederm Volbella XC
Lot #: [id number]
Price (Use Numbers Only, No Special Characters Or $): 400
Consent: Pt swished with antiseptic mouthwash for 30 seconds prior to treatment. \\n\\nWritten consent reviewed by RN and signed by pt. \\nRisks include but not limited to bruising, bleeding, irregular texture, ulceration, infection, allergic reaction, scar formation, incomplete augmentation, temporary nature, procedural pain, and vessel occlusion.  \\n\\Paty pt concerns and questions addressed, pt verbalized understanding.
Vermilion Lips Filler Volume In Cc: 0.5
Detail Level: Detailed
Map Statment: See Attach Map for Complete Details
Topical Anesthesia?: BLT gel (benzocaine 20%, lidocaine 6%, tetracaine 4%)
Saucerization Excision Additional Text (Leave Blank If You Do Not Want): The margin was drawn around the clinically apparent lesion.  Incisions were then made along these lines, in a tangential fashion, to the appropriate tissue plane and the lesion was extirpated.

## 2021-06-10 NOTE — PROCEDURE: BOTOX
Additional Area 3 Units: 0
Lot #: [id number]
Glabellar Complex Units: 20
Consent: Written consent reviewed by RN and signed by pt. Risks include but not limited to lid/brow ptosis, bruising, swelling, diplopia, temporary effect, incomplete chemical denervation.  \\nPt denies pregnancy. \\nIf pt is breastfeeding, they are counseled to dispose of breast milk for 24 hours post botox injection. \\nPt denies current administration of anti-biotics. \\nPt denies allergy to albumin or lactose. \\nPt's taking blood thinners are counseled on the increased risk of bleeding and bruising at the injection site.
Additional Area 2 Units: 4
Periorbital Skin Units: 28
Post-Care Instructions: Patient instructed to not lie down for 4 hours and limit physical activity for 24 hours. RN recommends topical arnica and/or ice if bruising presents.\\n\\nPt instructed to contact the clinic with any questions, concerns, or post treatment complications, ie: the pt feels the product did not work for them, etc.  \\n\\Paty pt concerns and questions addressed and pt verbalized understanding.
Additional Area 1 Location: upper lip
Price (Use Numbers Only, No Special Characters Or $): 1004
Detail Level: Detailed
Additional Area 2 Location: Bunnies
Additional Area 1 Units: 8
R Brow Units: 2

## 2021-06-10 NOTE — PROCEDURE: BENIGN DESTRUCTION COSMETIC
Detail Level: Detailed
Consent verbally reviewed by RN and signed by pt. Risks include but are not limited to crusting, scabbing, blistering, scarring, darker or lighter pigmentary change, recurrence, incomplete removal and infection.
Anesthesia Volume In Cc: 0.5
Post-Care Instructions: I reviewed with the patient in detail post-care instructions. Patient is to wear sunprotection, and avoid picking at any of the treated lesions. Pt may apply aquaphor to crusted or scabbing areas.

## 2021-07-27 ENCOUNTER — APPOINTMENT (RX ONLY)
Dept: URBAN - NONMETROPOLITAN AREA CLINIC 1 | Facility: CLINIC | Age: 71
Setting detail: DERMATOLOGY
End: 2021-07-27

## 2021-07-27 DIAGNOSIS — Z41.9 ENCOUNTER FOR PROCEDURE FOR PURPOSES OTHER THAN REMEDYING HEALTH STATE, UNSPECIFIED: ICD-10-CM

## 2021-07-27 PROCEDURE — ? BOTOX

## 2021-07-27 PROCEDURE — ? ADDITIONAL NOTES

## 2021-07-27 PROCEDURE — ? FILLERS

## 2021-07-27 NOTE — PROCEDURE: FILLERS
Jawline Filler Volume In Cc: 0
Include Cannula Information In Note?: No
Lot #: [id number]
Topical Anesthesia?: 23% lidocaine, 7% tetracaine
Jawline Filler Volume In Cc: 1
Consent: Pt swished with antiseptic mouthwash for 30 seconds prior to treatment. \\n\\nWritten consent reviewed by RN and signed by pt. \\nRisks include but not limited to bruising, bleeding, irregular texture, ulceration, infection, allergic reaction, scar formation, incomplete augmentation, temporary nature, procedural pain, and vessel occlusion.  \\n\\Paty pt concerns and questions addressed, pt verbalized understanding.
Map Statment: See Attach Map for Complete Details
Post-Care Instructions: Patient instructed to apply ice to reduce swelling. RN also recommends Arnica gel to reduce bruising. Pt instructed to contact the provider if complications arise.
Filler: Juvederm Voluma XC
Price (Use Numbers Only, No Special Characters Or $): 807
Detail Level: Detailed

## 2021-07-27 NOTE — PROCEDURE: BOTOX
Periorbital Skin Units: 0
Consent: Written consent reviewed by RN and signed by pt. Risks include but not limited to lid/brow ptosis, bruising, swelling, diplopia, temporary effect, incomplete chemical denervation.  \\nPt denies pregnancy. \\nIf pt is breastfeeding, they are counseled to dispose of breast milk for 24 hours post botox injection. \\nPt denies current administration of anti-biotics. \\nPt denies allergy to albumin or lactose. \\nPt's taking blood thinners are counseled on the increased risk of bleeding and bruising at the injection site.
Lot #: [id number]
Detail Level: Detailed
Post-Care Instructions: Patient instructed to not lie down for 4 hours and limit physical activity for 24 hours. RN recommends topical arnica and/or ice if bruising presents.\\n\\nPt instructed to contact the clinic with any questions, concerns, or post treatment complications, ie: the pt feels the product did not work for them, etc.  \\n\\Paty pt concerns and questions addressed and pt verbalized understanding.
Dilution (U/0.1 Cc): 4
Price (Use Numbers Only, No Special Characters Or $): 96
Forehead Units: 8

## 2021-08-27 ENCOUNTER — APPOINTMENT (RX ONLY)
Dept: URBAN - NONMETROPOLITAN AREA CLINIC 1 | Facility: CLINIC | Age: 71
Setting detail: DERMATOLOGY
End: 2021-08-27

## 2021-08-27 DIAGNOSIS — Z41.9 ENCOUNTER FOR PROCEDURE FOR PURPOSES OTHER THAN REMEDYING HEALTH STATE, UNSPECIFIED: ICD-10-CM

## 2021-08-27 PROCEDURE — ? BOTOX

## 2021-08-27 PROCEDURE — ? ADDITIONAL NOTES

## 2021-08-27 NOTE — PROCEDURE: BOTOX
Lot #: [id number]
Post-Care Instructions: Patient instructed to not lie down for 4 hours and limit physical activity for 24 hours. RN recommends topical arnica and/or ice if bruising presents.\\n\\nPt instructed to contact the clinic with any questions, concerns, or post treatment complications, ie: the pt feels the product did not work for them, etc.  \\n\\Paty pt concerns and questions addressed and pt verbalized understanding.
Additional Area 2 Location: upper lip
Masseter Units: 0
Additional Area 1 Location: bunnies
Price (Use Numbers Only, No Special Characters Or $): 222
Glabellar Complex Units: 14
Additional Area 2 Units: 8
Consent: Written consent reviewed by RN and signed by pt. Risks include but not limited to lid/brow ptosis, bruising, swelling, diplopia, temporary effect, incomplete chemical denervation.  \\nPt denies pregnancy. \\nIf pt is breastfeeding, they are counseled to dispose of breast milk for 24 hours post botox injection. \\nPt denies current administration of anti-biotics. \\nPt denies allergy to albumin or lactose. \\nPt's taking blood thinners are counseled on the increased risk of bleeding and bruising at the injection site.
Detail Level: Detailed
Dilution (U/0.1 Cc): 4
Periorbital Skin Units: 24
Right Pupillary Line Units: 2

## 2021-09-16 ENCOUNTER — APPOINTMENT (RX ONLY)
Dept: URBAN - NONMETROPOLITAN AREA CLINIC 1 | Facility: CLINIC | Age: 71
Setting detail: DERMATOLOGY
End: 2021-09-16

## 2021-09-16 DIAGNOSIS — Z41.9 ENCOUNTER FOR PROCEDURE FOR PURPOSES OTHER THAN REMEDYING HEALTH STATE, UNSPECIFIED: ICD-10-CM

## 2021-09-16 PROCEDURE — ? BOTOX

## 2021-09-16 PROCEDURE — ? ADDITIONAL NOTES

## 2021-09-16 NOTE — PROCEDURE: BOTOX
Consent: Written consent reviewed by RN and signed by pt. Risks include but not limited to lid/brow ptosis, bruising, swelling, diplopia, temporary effect, incomplete chemical denervation.  \\nPt denies pregnancy. \\nIf pt is breastfeeding, they are counseled to dispose of breast milk for 24 hours post botox injection. \\nPt denies current administration of anti-biotics. \\nPt denies allergy to albumin or lactose. \\nPt's taking blood thinners are counseled on the increased risk of bleeding and bruising at the injection site.
Post-Care Instructions: Patient instructed to not lie down for 4 hours and limit physical activity for 24 hours. RN recommends topical arnica and/or ice if bruising presents.\\n\\nPt instructed to contact the clinic with any questions, concerns, or post treatment complications, ie: the pt feels the product did not work for them, etc.  \\n\\Paty pt concerns and questions addressed and pt verbalized understanding.\\n\\nComplimentary correction, product provided by [employer].
Inferior Lateral Orbicularis Oculi Units: 0
Detail Level: Detailed
Forehead Units: 8
Lot #: [id number]
Dilution (U/0.1 Cc): 4

## 2021-11-18 ENCOUNTER — APPOINTMENT (RX ONLY)
Dept: URBAN - NONMETROPOLITAN AREA CLINIC 1 | Facility: CLINIC | Age: 71
Setting detail: DERMATOLOGY
End: 2021-11-18

## 2021-11-18 DIAGNOSIS — Z41.9 ENCOUNTER FOR PROCEDURE FOR PURPOSES OTHER THAN REMEDYING HEALTH STATE, UNSPECIFIED: ICD-10-CM

## 2021-11-18 PROCEDURE — ? BOTOX

## 2021-11-18 NOTE — PROCEDURE: BOTOX
Additional Area 5 Units: 0
Additional Area 1 Location: Lips
Periorbital Skin Units: 24
Show Mentalis Units: No
Dilution (U/0.1 Cc): 4
Show Additional Area 2: Yes
Reconstitution Date (Optional): 11/16/21, 11/18/21
Post-Care Instructions: Patient instructed to not lie down for 4 hours and limit physical activity for 24 hours. Patient instructed not to travel by airplane for 48 hours.
Forehead Units: 8
Additional Area 2 Location: Bunny lines
Expiration Date (Month Year): 01/2024
Additional Area 4 Location: inferior brow
Lot #: [id number]
Glabellar Complex Units: 14
Price (Use Numbers Only, No Special Characters Or $): 428
Detail Level: Detailed
Additional Area 3 Location: Nasal alae
Consent: Written consent obtained. Risks include but not limited to lid/brow ptosis, bruising, swelling, diplopia, temporary effect, incomplete chemical denervation.

## 2021-12-15 ENCOUNTER — APPOINTMENT (RX ONLY)
Dept: URBAN - NONMETROPOLITAN AREA CLINIC 1 | Facility: CLINIC | Age: 71
Setting detail: DERMATOLOGY
End: 2021-12-15

## 2021-12-15 DIAGNOSIS — Z41.9 ENCOUNTER FOR PROCEDURE FOR PURPOSES OTHER THAN REMEDYING HEALTH STATE, UNSPECIFIED: ICD-10-CM

## 2021-12-15 PROCEDURE — ? FILLERS

## 2021-12-15 PROCEDURE — ? IRIDEX LASER

## 2021-12-15 ASSESSMENT — LOCATION DETAILED DESCRIPTION DERM
LOCATION DETAILED: LEFT LOWER CUTANEOUS LIP
LOCATION DETAILED: RIGHT SUPERIOR MEDIAL BUCCAL CHEEK
LOCATION DETAILED: LEFT LATERAL CANTHUS
LOCATION DETAILED: RIGHT SUPERIOR VERMILION LIP
LOCATION DETAILED: LEFT INFERIOR VERMILION LIP
LOCATION DETAILED: RIGHT INFERIOR VERMILION LIP
LOCATION DETAILED: RIGHT LOWER CUTANEOUS LIP
LOCATION DETAILED: LEFT SUPERIOR CENTRAL BUCCAL CHEEK
LOCATION DETAILED: LEFT SUPERIOR VERMILION LIP

## 2021-12-15 ASSESSMENT — LOCATION ZONE DERM
LOCATION ZONE: EYELID
LOCATION ZONE: FACE
LOCATION ZONE: LIP

## 2021-12-15 ASSESSMENT — LOCATION SIMPLE DESCRIPTION DERM
LOCATION SIMPLE: LEFT EYELID
LOCATION SIMPLE: LEFT LIP
LOCATION SIMPLE: RIGHT CHEEK
LOCATION SIMPLE: RIGHT LIP
LOCATION SIMPLE: LEFT CHEEK

## 2021-12-15 NOTE — PROCEDURE: FILLERS
Vermilion Lips Filler Volume In Cc: 0.5
Temple Hollows Filler Volume In Cc: 0
Price (Use Numbers Only, No Special Characters Or $): 6694
Filler: Juvederm Volbella XC
Include Cannula Information In Note?: No
Detail Level: Detailed
Mid Face Filler Volume In Cc: 1
Consent: Pt swished with antiseptic mouthwash for 30 seconds prior to treatment. \\n\\nWritten consent reviewed by RN and signed by pt. \\nRisks include but not limited to bruising, bleeding, irregular texture, ulceration, infection, allergic reaction, scar formation, incomplete augmentation, temporary nature, procedural pain, and vessel occlusion.  \\n\\Paty pt concerns and questions addressed, pt verbalized understanding.
Topical Anesthesia?: 23% lidocaine, 7% tetracaine
Filler: Juvederm Vollure XC
Lot #: [id number]
Post-Care Instructions: Patient instructed to apply ice to reduce swelling. RN also recommends Arnica gel to reduce bruising. Pt instructed to contact the provider if complications arise.
Map Statment: See Attach Map for Complete Details
Lot #: [id number]

## 2021-12-15 NOTE — PROCEDURE: IRIDEX LASER
Handpiece: 700 micron
Post Procedure Text: Included with injectable tx to reduce the duration of bruising.
Detail Level: Zone
Jon Override (Optional): 532 wavelength
Joules: 6
Post-Care Instructions: RN reviewed with the patient in detail post-care instructions. Patient is to apply vaseline with a q-tip to all crusted areas, and avoid picking at any scabs. Pt should stay away from the sun and wear sun protection until fully healed. Pt verbalized understanding.
Pre Procedure Text: E=6\\nPD=10\\nRR=3
Treatment Number: 1
Mcleod: 3 mcleod
Repetition Rate: 3 ms
Pulse Count (Optional): 75

## 2022-01-07 ENCOUNTER — APPOINTMENT (RX ONLY)
Dept: URBAN - NONMETROPOLITAN AREA CLINIC 1 | Facility: CLINIC | Age: 72
Setting detail: DERMATOLOGY
End: 2022-01-07

## 2022-01-07 DIAGNOSIS — L81.4 OTHER MELANIN HYPERPIGMENTATION: ICD-10-CM

## 2022-01-07 DIAGNOSIS — L82.0 INFLAMED SEBORRHEIC KERATOSIS: ICD-10-CM

## 2022-01-07 DIAGNOSIS — D22 MELANOCYTIC NEVI: ICD-10-CM

## 2022-01-07 DIAGNOSIS — L57.8 OTHER SKIN CHANGES DUE TO CHRONIC EXPOSURE TO NONIONIZING RADIATION: ICD-10-CM

## 2022-01-07 PROBLEM — D22.4 MELANOCYTIC NEVI OF SCALP AND NECK: Status: ACTIVE | Noted: 2022-01-07

## 2022-01-07 PROCEDURE — ? LIQUID NITROGEN

## 2022-01-07 PROCEDURE — 99213 OFFICE O/P EST LOW 20 MIN: CPT | Mod: 25

## 2022-01-07 PROCEDURE — ? PHOTO-DOCUMENTATION

## 2022-01-07 PROCEDURE — ? COUNSELING

## 2022-01-07 PROCEDURE — 17110 DESTRUCTION B9 LES UP TO 14: CPT

## 2022-01-07 PROCEDURE — ? PRESCRIPTION

## 2022-01-07 RX ORDER — TRETIONIN 0.25 MG/G
1 CREAM TOPICAL QHS
Qty: 20 | Refills: 3 | Status: ERX | COMMUNITY
Start: 2022-01-07

## 2022-01-07 RX ADMIN — TRETIONIN 1: 0.25 CREAM TOPICAL at 00:00

## 2022-01-07 ASSESSMENT — LOCATION ZONE DERM
LOCATION ZONE: NECK
LOCATION ZONE: FACE
LOCATION ZONE: LEG
LOCATION ZONE: TRUNK
LOCATION ZONE: ARM

## 2022-01-07 ASSESSMENT — LOCATION SIMPLE DESCRIPTION DERM
LOCATION SIMPLE: LEFT POPLITEAL SKIN
LOCATION SIMPLE: RIGHT FOREARM
LOCATION SIMPLE: RIGHT POSTERIOR THIGH
LOCATION SIMPLE: INFERIOR FOREHEAD
LOCATION SIMPLE: TRAPEZIAL NECK
LOCATION SIMPLE: LEFT FOREARM
LOCATION SIMPLE: LEFT BUTTOCK

## 2022-01-07 ASSESSMENT — LOCATION DETAILED DESCRIPTION DERM
LOCATION DETAILED: RIGHT DISTAL POSTERIOR THIGH
LOCATION DETAILED: LEFT POPLITEAL SKIN
LOCATION DETAILED: MID TRAPEZIAL NECK
LOCATION DETAILED: LEFT BUTTOCK
LOCATION DETAILED: RIGHT PROXIMAL DORSAL FOREARM
LOCATION DETAILED: INFERIOR MID FOREHEAD
LOCATION DETAILED: LEFT PROXIMAL DORSAL FOREARM

## 2022-01-07 NOTE — PROCEDURE: LIQUID NITROGEN
Render Post-Care Instructions In Note?: yes
Number Of Freeze-Thaw Cycles: 2 freeze-thaw cycles
Render Note In Bullet Format When Appropriate: No
Medical Necessity Clause: This procedure was medically necessary because the lesions that were treated were:
Post-Care Instructions: I reviewed with the patient in detail post-care instructions. Patient is to wear sunprotection, and avoid picking at any of the treated lesions. Pt may apply Vaseline to crusted or scabbing areas.
Medical Necessity Information: It is in your best interest to select a reason for this procedure from the list below. All of these items fulfill various CMS LCD requirements except the new and changing color options.
Detail Level: Simple
Spray Paint Text: The liquid nitrogen was applied to the skin utilizing a spray paint frosting technique.
Consent: The patient's consent was obtained including but not limited to risks of crusting, scabbing, blistering, scarring, darker or lighter pigmentary change, recurrence, incomplete removal and infection.

## 2022-01-07 NOTE — PROCEDURE: MIPS QUALITY
Quality 402: Tobacco Use And Help With Quitting Among Adolescents: Patient screened for tobacco and never smoked
Quality 130: Documentation Of Current Medications In The Medical Record: Current Medications Documented
Detail Level: Generalized
Quality 111:Pneumonia Vaccination Status For Older Adults: Pneumococcal Vaccination Previously Received
Quality 226: Preventive Care And Screening: Tobacco Use: Screening And Cessation Intervention: Patient screened for tobacco use and is an ex/non-smoker

## 2022-02-03 ENCOUNTER — APPOINTMENT (RX ONLY)
Dept: URBAN - NONMETROPOLITAN AREA CLINIC 1 | Facility: CLINIC | Age: 72
Setting detail: DERMATOLOGY
End: 2022-02-03

## 2022-02-03 DIAGNOSIS — Z41.9 ENCOUNTER FOR PROCEDURE FOR PURPOSES OTHER THAN REMEDYING HEALTH STATE, UNSPECIFIED: ICD-10-CM

## 2022-02-03 PROCEDURE — ? FILLERS

## 2022-02-03 PROCEDURE — ? XEOMIN

## 2022-02-03 PROCEDURE — ? COSMETIC FOLLOW-UP

## 2022-02-03 NOTE — PROCEDURE: XEOMIN
Inferior Lateral Orbicularis Oculi Units: 0
Show Right And Left Periorbital Units: No
Additional Area 1 Units: 8
Show Additional Area 4: Yes
Lot #: [id number]
Periorbital Skin Units: 32
Post-Care Instructions: Patient instructed to not lie down for 4 hours and limit physical activity for 24 hours.
Nasal Root Units: 4
Forehead Units: 20
Additional Area 1 Location: upper lip
Detail Level: Detailed
Consent: Written consent obtained. Risks include but not limited to lid/brow ptosis, bruising, swelling, diplopia, temporary effect, incomplete chemical denervation.\\n\\n\\nComplimentary trial of xeomin, product provided by [employer].

## 2022-02-03 NOTE — PROCEDURE: COSMETIC FOLLOW-UP
Price (Use Numbers Only, No Special Characters Or $): 0
Detail Level: Zone
Comments (Free Text): Pt states she sees a depresssion in the mid face after filler tx.

## 2022-02-03 NOTE — PROCEDURE: FILLERS
Vermilion Lips Filler Volume In Cc: 0
Filler Comments: Complimentary correction of mid face hollowing. Product provided by [employer].
Include Cannula Information In Note?: Yes
Include Cannula Information In Note?: No
Include Cannula Size?: 25G
Lot #: [id number]
Map Statment: See Attach Map for Complete Details
Consent: Pt swished with antiseptic mouthwash for 30 seconds prior to treatment. \\n\\nWritten consent reviewed by RN and signed by pt. \\nRisks include but not limited to bruising, bleeding, irregular texture, ulceration, infection, allergic reaction, scar formation, incomplete augmentation, temporary nature, procedural pain, and vessel occlusion.  \\n\\Paty pt concerns and questions addressed, pt verbalized understanding.
Include Cannula Length?: 1.5 inch
Filler: Juvederm Ultra
Detail Level: Detailed
Topical Anesthesia?: 23% lidocaine, 7% tetracaine
Mid Face Filler Volume In Cc: 1
Post-Care Instructions: Patient instructed to apply ice to reduce swelling. RN also recommends Arnica gel to reduce bruising. Pt instructed to contact the provider if complications arise.

## 2022-03-31 ENCOUNTER — APPOINTMENT (RX ONLY)
Dept: URBAN - NONMETROPOLITAN AREA CLINIC 1 | Facility: CLINIC | Age: 72
Setting detail: DERMATOLOGY
End: 2022-03-31

## 2022-03-31 DIAGNOSIS — Z41.9 ENCOUNTER FOR PROCEDURE FOR PURPOSES OTHER THAN REMEDYING HEALTH STATE, UNSPECIFIED: ICD-10-CM

## 2022-03-31 PROCEDURE — ? GENTLEYAG

## 2022-03-31 PROCEDURE — ? BOTOX

## 2022-03-31 PROCEDURE — ? ADDITIONAL NOTES

## 2022-03-31 PROCEDURE — ? HYALURONIDASE INJECTION

## 2022-03-31 ASSESSMENT — LOCATION SIMPLE DESCRIPTION DERM
LOCATION SIMPLE: RIGHT FOREHEAD
LOCATION SIMPLE: LEFT CHEEK

## 2022-03-31 ASSESSMENT — LOCATION DETAILED DESCRIPTION DERM
LOCATION DETAILED: RIGHT INFERIOR LATERAL FOREHEAD
LOCATION DETAILED: LEFT CENTRAL MALAR CHEEK

## 2022-03-31 ASSESSMENT — LOCATION ZONE DERM: LOCATION ZONE: FACE

## 2022-03-31 NOTE — PROCEDURE: BOTOX
Consent: Written consent reviewed by RN and signed by pt. Risks include but not limited to lid/brow ptosis, bruising, swelling, diplopia, temporary effect, incomplete chemical denervation.  \\nPt denies pregnancy. \\nIf pt is breastfeeding, they are counseled to dispose of breast milk for 24 hours post botox injection. \\nPt denies current administration of anti-biotics. \\nPt denies allergy to albumin or lactose. \\nPt's taking blood thinners are counseled on the increased risk of bleeding and bruising at the injection site.
Post-Care Instructions: Patient instructed to not lie down for 4 hours and limit physical activity for 24 hours. RN recommends topical arnica and/or ice if bruising presents.\\n\\nPt instructed to contact the clinic with any questions, concerns, or post treatment complications, ie: the pt feels the product did not work for them, etc.  \\n\\Paty pt concerns and questions addressed and pt verbalized understanding.
Additional Area 1 Units: 8
Additional Area 5 Units: 0
Lot #: [id number]
Additional Area 1 Location: upper lip
Periorbital Skin Units: 16
Detail Level: Detailed
Forehead Units: 20
Nasal Root Units: 4
Price (Use Numbers Only, No Special Characters Or $): 416
R Brow Units: 2

## 2022-03-31 NOTE — PROCEDURE: HYALURONIDASE INJECTION
Detail Level: Detailed
Administered By (Optional): DHIRAJ De La Garza
Consent: The risks of contour defects and dimpling of the skin were reviewed with the patient prior to the injection.
Lot # (Optional): [id number]
Filler Previously Used (Optional): ultra
Expiration Date (Optional): 05/24
Total Volume (Ccs): 0.4
Treatment Number (Optional): 1

## 2022-03-31 NOTE — PROCEDURE: GENTLEYAG
Total Pulses: 5
External Cooling Fan Speed: 0
Fluence: 110
Cooling: DCD setting
Laser Type: Nd:YAG 1064nm
Treatment Number: 1
Detail Level: Detailed
Consent: Written consent verbally reviewed by RN, signed by pt and written copy offered to pt.  \\n\\nRisks reviewed including but not limited to pain, crusting, scabbing, blistering, scarring, darker or lighter pigmentary change which can be permanent, incidental hair removal, re-activation of cold sores, bruising, edema, erythema, pustules and/or pimples, infection, and lack of desired result.\\n\\nNo guarantees can be made and results vary pt to pt.  Pt understands multiple txs may be necessary to achieve desired result.  \\n\\nPt understands the tx is cosmetic in nature and not covered by insurance.\\n\\Paty pt questions addressed and pt verbalized understanding.
Spot Size: 3 mm
Endpoint: Pt swished with antiseptic mouthwash for 30 seconds prior to removing mask for tx. \\n\\nAppropriate eye protection placed on pt and pt instructed to keep their eyes closed during the tx. \\n\\nTopical numbing, if used, was removed with warm towel prior to tx. \\n\\nPost tx Alastin nectar, soothe & protect balm, and hydratint applied to tx area.
Pulse Duration: 50 ms

## 2022-03-31 NOTE — PROCEDURE: ADDITIONAL NOTES
Detail Level: Simple
Render Risk Assessment In Note?: no
Additional Notes: Post care instructions were reviewed in detail with pt. \\n\\nPt instructed to avoid the sun pre and post tx. Post tx, protect with a broad spectrum, physical sunscreen and avoid direct sun exposure if possible, during the healing period. Apply gentle moisturizer or laser balm to treated areas, especially at night. Avoid extremes in heat, intense workouts, and application of makeup for 24 hours post tx. Do not scrub, exfoliate, or pick at the skin during the healing process and avoid topical retinols until healed.\\n\\Paty pt questions addressed and pt verbalized understanding.
Additional Notes: I counseled the patient regarding the following:\\n\\nRN discussed the risks and benefits of botox including but not limited to bruising, muscle weakness, lid or brow ptosis, double vision, facial weakness, headaches, procedural pain, temporary benefit only.\\n\\nIt is impossible to know the exact location of the vasculature in the treatment area. If a vein or artery is compromised by the injection, pt may experience an increase in bruising as a result. This is temporary and usually resolves in a few days to a few weeks. \\n\\nPt understands botox is a neuro-modulator that is injected into the muscle, which relaxes the muscle movement. \\n\\nPatient understands that treatment with botox only lessens dynamic wrinkles on a temporary basis, usually for 2-3 months. \\n\\nPt is aware of the variability in patient response, including a lack of response to treatment, and that no guarantee of length of benefit can be made. \\n\\nPt taking blood thinners prior to treatment may experience an increased risk for bruising and bleeding at the injection site. \\n\\nPatient should not lie down for 4 hours after injections nor exercise for 24 hours. \\n\\nIf bruising presents, pt may utilize oral or topical arnica and ice for treatment. \\n\\nPt instructed to contact the clinic with any questions, concerns, or post treatment complications; ie: pt does not feel the product worked for them, etc.\\n\\nPatient understands that the treatment is cosmetic in nature and not covered by insurance.\\n\\Paty pt concerns and questions addressed and pt verbalized understanding.

## 2022-05-13 ENCOUNTER — APPOINTMENT (RX ONLY)
Dept: URBAN - NONMETROPOLITAN AREA CLINIC 1 | Facility: CLINIC | Age: 72
Setting detail: DERMATOLOGY
End: 2022-05-13

## 2022-05-13 DIAGNOSIS — Z41.9 ENCOUNTER FOR PROCEDURE FOR PURPOSES OTHER THAN REMEDYING HEALTH STATE, UNSPECIFIED: ICD-10-CM

## 2022-05-13 PROCEDURE — ? COSMETIC FOLLOW-UP

## 2022-05-13 PROCEDURE — ? BOTOX

## 2022-05-13 PROCEDURE — ? COSMETIC CONSULTATION: GENERAL

## 2022-05-13 PROCEDURE — ? ADDITIONAL NOTES

## 2022-05-13 NOTE — PROCEDURE: BOTOX
Show Topical Anesthesia: Yes
Additional Area 2 Units: 8
Right Pupillary Line Units: 0
Price (Use Numbers Only, No Special Characters Or $): 382
Additional Area 2 Location: upper lip
Dilution (U/0.1 Cc): 4
Detail Level: Detailed
Post-Care Instructions: Patient instructed to not lie down for 4 hours and limit physical activity for 24 hours. RN recommends topical arnica and/or ice if bruising presents.\\n\\nPt instructed to contact the clinic with any questions, concerns, or post treatment complications.\\n\\Paty pt concerns and questions addressed and pt verbalized understanding.
Consent: Written consent reviewed by RN and signed by pt. Risks include but not limited to lid/brow ptosis, bruising, swelling, diplopia, temporary effect, incomplete chemical denervation.  \\nPt denies pregnancy.  \\nIf pt is breastfeeding, they are counseled to dispose of breast milk for 24 hours post botox injection. \\nPt denies current administration of anti-biotics. \\nPt denies allergy to albumin or lactose. \\nPt's taking blood thinners are counseled on the increased risk of bleeding and bruising at the injection site.
Lot #: [id number]
Periorbital Skin Units: 16
Comments: Pt asked to animate muscles in the treatment area and musculature was palpated for proper injection placement.
R Brow Units: 2
Forehead Units: 20
Additional Area 1 Location: bunnies

## 2022-05-13 NOTE — PROCEDURE: ADDITIONAL NOTES
Additional Notes: I counseled the patient regarding the following:\\n\\nRN discussed the risks and benefits of botox including but not limited to bruising, muscle weakness, lid or brow ptosis, double vision, facial weakness, headaches, procedural pain, temporary benefit only.\\n\\nIt is impossible to know the exact location of the vasculature in the treatment area. If a vein or artery is compromised by the injection, pt may experience an increase in bruising as a result. This is temporary and usually resolves in a few days to a few weeks. \\n\\nPt understands botox is a neuro-modulator that is injected into the muscle, which relaxes the muscle movement. \\n\\nPatient understands that treatment with botox only lessens dynamic wrinkles on a temporary basis, usually for 2-3 months. \\n\\nPt is aware of the variability in patient response, including a lack of response to treatment, and that no guarantee of length of benefit can be made. \\n\\nPt taking blood thinners prior to treatment may experience an increased risk for bruising and bleeding at the injection site. \\n\\nPatient should not lie down for 4 hours after injections nor exercise for 24 hours. \\n\\nIf bruising presents, pt may utilize oral or topical arnica and ice for treatment. \\n\\nPt instructed to contact the clinic with any questions, concerns, or post treatment complications.\\n\\nPatient understands that the treatment is cosmetic in nature and not covered by insurance.\\n\\Paty pt concerns and questions addressed and pt verbalized understanding.
Detail Level: Simple
Render Risk Assessment In Note?: yes

## 2022-05-13 NOTE — PROCEDURE: COSMETIC FOLLOW-UP
Comments (Free Text): Pt states she sees a lump in the L mid cheek r/t filler injection. I recommended a small amount of hylenex to dissolve the lump she is concerned with. Pt agreed and is scheduled for 5/20/22 for hylenex injection.
Detail Level: Zone
Price (Use Numbers Only, No Special Characters Or $): 0
Global Improvement: Worsened
Treatment Override (Free Text): filler
Patient Satisfaction: Dissatisfied
Side Effects Override (Free Text): lumpy L mid cheek

## 2022-05-20 ENCOUNTER — APPOINTMENT (RX ONLY)
Dept: URBAN - NONMETROPOLITAN AREA CLINIC 1 | Facility: CLINIC | Age: 72
Setting detail: DERMATOLOGY
End: 2022-05-20

## 2022-05-20 DIAGNOSIS — Z41.9 ENCOUNTER FOR PROCEDURE FOR PURPOSES OTHER THAN REMEDYING HEALTH STATE, UNSPECIFIED: ICD-10-CM

## 2022-05-20 PROCEDURE — ? HYALURONIDASE INJECTION

## 2022-05-20 ASSESSMENT — LOCATION ZONE DERM: LOCATION ZONE: FACE

## 2022-05-20 ASSESSMENT — LOCATION DETAILED DESCRIPTION DERM: LOCATION DETAILED: LEFT CENTRAL MALAR CHEEK

## 2022-05-20 ASSESSMENT — LOCATION SIMPLE DESCRIPTION DERM: LOCATION SIMPLE: LEFT CHEEK

## 2022-05-20 NOTE — PROCEDURE: HYALURONIDASE INJECTION
Detail Level: Detailed
Treatment Number (Optional): 2
Consent: The risks of contour defects and dimpling of the skin were reviewed with the patient prior to the injection.
Administered By (Optional): DHIRAJ De La Garza
Total Volume (Ccs): 1.2
Lot # (Optional): [id number]
Expiration Date (Optional): 05/24
Hyaluronidase Preparation: 2ml hyaluronidase with 0.2ml xylocaine 1%
Filler Previously Used (Optional): Juvederm ultra

## 2022-08-05 ENCOUNTER — APPOINTMENT (RX ONLY)
Dept: URBAN - NONMETROPOLITAN AREA CLINIC 1 | Facility: CLINIC | Age: 72
Setting detail: DERMATOLOGY
End: 2022-08-05

## 2022-08-05 DIAGNOSIS — Z41.9 ENCOUNTER FOR PROCEDURE FOR PURPOSES OTHER THAN REMEDYING HEALTH STATE, UNSPECIFIED: ICD-10-CM

## 2022-08-05 PROCEDURE — ? BOTOX

## 2022-08-05 NOTE — PROCEDURE: BOTOX
Nasal Root Units: 0
Show Additional Area 4: Yes
Comments: Pt asked to animate muscles in the treatment area and musculature was palpated for proper injection placement.
Additional Area 2 Units: 8
Additional Area 2 Location: upper lip
Consent: Written consent reviewed by RN and signed by pt. Risks include but not limited to lid/brow ptosis, bruising, swelling, diplopia, temporary effect, incomplete chemical denervation.   \\nPt's taking blood thinners are counseled on the increased risk of bleeding and bruising at the injection site.
Price (Use Numbers Only, No Special Characters Or $): 931
Post-Care Instructions: Patient instructed to not lie down for 4 hours and limit physical activity for 24 hours. RN recommends topical arnica and/or ice if bruising presents.\\n\\nPt instructed to contact the clinic with any questions, concerns, or post treatment complications.\\n\\Paty pt concerns and questions addressed and pt verbalized understanding.
Dilution (U/0.1 Cc): 4
Detail Level: Detailed
Forehead Units: 20
Lot #: [id number]
Glabellar Complex Units: 16

## 2022-08-17 ENCOUNTER — OFFICE VISIT (OUTPATIENT)
Dept: OPHTHALMOLOGY | Facility: MEDICAL CENTER | Age: 72
End: 2022-08-17
Payer: MEDICARE

## 2022-08-17 DIAGNOSIS — H49.9 OPHTHALMOPLEGIA: ICD-10-CM

## 2022-08-17 DIAGNOSIS — H35.341 MACULAR HOLE OF RIGHT EYE: ICD-10-CM

## 2022-08-17 DIAGNOSIS — Z96.1 PSEUDOPHAKIA OF BOTH EYES: ICD-10-CM

## 2022-08-17 DIAGNOSIS — H40.003 GLAUCOMA SUSPECT OF BOTH EYES: ICD-10-CM

## 2022-08-17 PROCEDURE — 92060 SENSORIMOTOR EXAMINATION: CPT | Performed by: OPHTHALMOLOGY

## 2022-08-17 PROCEDURE — 99205 OFFICE O/P NEW HI 60 MIN: CPT | Mod: 25 | Performed by: OPHTHALMOLOGY

## 2022-08-17 PROCEDURE — 92250 FUNDUS PHOTOGRAPHY W/I&R: CPT | Performed by: OPHTHALMOLOGY

## 2022-08-17 ASSESSMENT — REFRACTION_MANIFEST
OD_SPHERE: PLANO
OD_CYLINDER: +0.75
OS_CYLINDER: +1.00
OS_AXIS: 004
OS_SPHERE: +0.25
OD_AXIS: 011

## 2022-08-17 ASSESSMENT — REFRACTION_WEARINGRX
OD_VPRISM: 1.0
OS_ADD: +2.50
OS_VPRISM: 1.5
OS_AXIS: 002
SPECS_TYPE: BIFOCAL
OD_ADD: +2.50
OD_CYLINDER: +0.25
OS_CYLINDER: +0.50
OD_AXIS: 002
OS_SPHERE: PLANO
OD_VBASE: DOWN
OS_VBASE: UP
OD_SPHERE: -0.25

## 2022-08-17 ASSESSMENT — VISUAL ACUITY
OD_CC: J1
OD_CC: 20/25
METHOD: SNELLEN - LINEAR
OS_CC: 20/50+1
CORRECTION_TYPE: GLASSES
OS_CC: J5

## 2022-08-17 ASSESSMENT — TONOMETRY
OD_IOP_MMHG: 10
OS_IOP_MMHG: 10

## 2022-08-17 ASSESSMENT — CONF VISUAL FIELD
OD_SUPERIOR_TEMPORAL_RESTRICTION: 0
OS_SUPERIOR_TEMPORAL_RESTRICTION: 0
OD_SUPERIOR_NASAL_RESTRICTION: 0
OS_SUPERIOR_NASAL_RESTRICTION: 0
OS_NORMAL: 1
OS_INFERIOR_NASAL_RESTRICTION: 0
OS_INFERIOR_TEMPORAL_RESTRICTION: 0
OD_INFERIOR_NASAL_RESTRICTION: 0
OD_INFERIOR_TEMPORAL_RESTRICTION: 0
OD_NORMAL: 1

## 2022-08-17 ASSESSMENT — SLIT LAMP EXAM - LIDS
COMMENTS: NORMAL
COMMENTS: NORMAL

## 2022-08-17 ASSESSMENT — EXTERNAL EXAM - LEFT EYE: OS_EXAM: NORMAL

## 2022-08-17 ASSESSMENT — ENCOUNTER SYMPTOMS: DOUBLE VISION: 1

## 2022-08-17 ASSESSMENT — EXTERNAL EXAM - RIGHT EYE: OD_EXAM: NORMAL

## 2022-08-17 NOTE — ASSESSMENT & PLAN NOTE
8/17/2022 - progressive difficulty controlling eyes together. By history initial problem as child with some systemic infection. 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 In primary is 12 ET and 10 RHT. I reviewed the most recent MRI from 2021 and there was no enhancing lesions involving CN 4, 3, or 6 th. Thus suspect breakdown in control over the past year with the IOOA. Dicussed that could consider a RIOAT and LMR recession for 12 diopters. In PAT in primary no double vision. Gave information to read at home and discussed the risks and benefits of the surgery.

## 2022-08-17 NOTE — ASSESSMENT & PLAN NOTE
8/17/2022 - Macular hole OS discovered on OCT. Has seen Eddy in the past and with stable acuity was monitoring.

## 2022-08-17 NOTE — PROGRESS NOTES
Peds/Neuro Ophthalmology:   Eber Lopez M.D.    Date & Time note created:    8/17/2022   12:23 PM     Referring MD / APRN:  Carlin Whitt M.D., No att. providers found    Patient ID:  Name:             Angie Dover   YOB: 1950  Age:                 72 y.o.  female   MRN:               [id number]    Chief Complaint/Reason for Visit:     Diplopia      History of Present Illness:    Angie Dvoer is a 72 y.o. female   Patient referred for strabismus with double vision for years but double vision worse  past 1 year.Double vision is vertical.No history of eye muscle surgery.Patient has had cataracts surgery in both eyes.Patient says she has dry eyes and uses refresh with some relief.Patient having difficulty driving because of poor depth perception.       Review of Systems:  Review of Systems   Eyes:  Positive for double vision.   All other systems reviewed and are negative.    Past Medical History:   Past Medical History:   Diagnosis Date    Arthritis     all over    Bronchitis 2018    bad flu    Cataract     Depression     Heart burn     Indigestion     Urinary incontinence     had some after back surgery, but no longer with Myrbetriq       Past Surgical History:  Past Surgical History:   Procedure Laterality Date    BUNIONECTOMY Right 5/26/2020    Procedure: BUNIONECTOMY- AKIN OSTEOTOMY;  Surgeon: Gordon Diaz M.D.;  Location: SURGERY [facility];  Service: Orthopedics    OTHER NEUROLOGICAL SURG  05/2019    Lumbar, cage, Faulk    OTHER ORTHOPEDIC SURGERY Right 2017    carpal tunnel release    OTHER      isamar cataract    OTHER      lower face lift       Current Outpatient Medications:  Current Outpatient Medications   Medication Sig Dispense Refill    lidocaine (LIDODERM) 5 % Patch Place 1 Patch on the skin every 24 hours. 10 Patch 0    ALPRAZolam (XANAX) 0.5 MG Tab Take 0.5 mg by mouth every bedtime.      Mirabegron ER (MYRBETRIQ) 50 MG TABLET SR 24 HR Take 1 Tab by  mouth every day.      acetaminophen (TYLENOL) 500 MG Tab Take 500-1,000 mg by mouth every 6 hours as needed.      fluoxetine (PROZAC) 40 MG capsule Take 40 mg by mouth every day.      trazodone (DESYREL) 100 MG Tab Take 100 mg by mouth every evening.      Lurasidone HCl 60 MG Tab Take 1 Tab by mouth every day.      methylphenidate (RITALIN) 20 MG tablet Take 20 mg by mouth 4 times a day.      methylPREDNISolone (MEDROL DOSEPAK) 4 MG Tablet Therapy Pack Use as directed (Patient not taking: Reported on 2022) 1 Each 0    baclofen (LIORESAL) 10 MG Tab Take 10 mg by mouth 4 times a day as needed.      Cholecalciferol (VITAMIN D3 PO) Take 1 Cap by mouth 2 Times a Day.       No current facility-administered medications for this visit.       Allergies:  Allergies   Allergen Reactions    Morphine Unspecified     Pt. States it gives her Migraine's     Penicillins Swelling     Pt. States swelling of the mouth     Sulfa Drugs Swelling     Pt. States mouth swelling        Family History:  Family History   Problem Relation Age of Onset    Glasses Mother        Social History:  Social History     Socioeconomic History    Marital status: Single     Spouse name: Not on file    Number of children: Not on file    Years of education: Not on file    Highest education level: Not on file   Occupational History    Not on file   Tobacco Use    Smoking status: Former     Years: 15.00     Types: Cigarettes     Quit date: 2017     Years since quittin.6    Smokeless tobacco: Never   Vaping Use    Vaping Use: Never used   Substance and Sexual Activity    Alcohol use: Yes     Comment: occasionally    Drug use: Never    Sexual activity: Not on file   Other Topics Concern    Not on file   Social History Narrative    Retired book keeper     Social Determinants of Health     Financial Resource Strain: Not on file   Food Insecurity: Not on file   Transportation Needs: Not on file   Physical Activity: Not on file   Stress: Not on file    Social Connections: Not on file   Intimate Partner Violence: Not on file   Housing Stability: Not on file          Physical Exam:  Physical Exam    Oriented x 3  Weight/BMI: There is no height or weight on file to calculate BMI.  There were no vitals taken for this visit.    Base Eye Exam       Visual Acuity (Snellen - Linear)         Right Left    Dist cc 20/25 20/50+1    Near cc J1 J5      Correction: Glasses              Tonometry (7:38 AM)         Right Left    Pressure 10 10              Pupils         Pupils    Right PERRL    Left PERRL              Visual Fields         Right Left     Full Full              Neuro/Psych       Oriented x3: Yes    Mood/Affect: Normal              Dilation       Both eyes: able to view without dilation @ 12:15 PM                  Additional Tests       Color         Right Left    Ishihara 7/9 9/9              Stereo       Fly: -    Animals: 0/3    Circles: 0/9                  Strabismus Exam       Correction: sc      Distance Near Near +3DS Near Bifocals                      0 0 +4   0 0 0                       0  0  ET 12 0  -1            RHT 10           0 0 -2   0 0 0                       Slit Lamp and Fundus Exam       External Exam         Right Left    External Normal Normal              Slit Lamp Exam         Right Left    Lids/Lashes Normal Normal    Conjunctiva/Sclera White and quiet White and quiet    Cornea Clear Clear    Anterior Chamber Deep and quiet Deep and quiet    Iris Round and reactive Round and reactive    Lens Posterior chamber intraocular lens Posterior chamber intraocular lens    Vitreous Normal Normal              Fundus Exam         Right Left    Disc Normal Normal    Macula ERM Normal    Vessels Normal Normal    Periphery Normal Normal                  Refraction       Wearing Rx         Sphere Cylinder Axis Add Vert Prism    Right -0.25 +0.25 002 +2.50 1.0 down    Left [place] +0.50 002 +2.50 1.5 up      Age: 1yr    Type: Bifocal               Manifest Refraction (Over)         Sphere Cylinder Axis    Right Vici +0.75 011    Left +0.25 +1.00 004                    Pertinent Lab/Test/Imaging Review:  Review of notes from [facility], MRI scans, Notes in [facility]    Assessment and Plan:     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 By history initial problem as child with some systemic infection. 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 In primary is 12 ET and 10 RHT. I reviewed the most recent MRI from 2021 and there was no enhancing lesions involving CN 4, 3, or 6 th. Thus suspect breakdown in control over the past year with the IOOA. Dicussed that could consider a RIOAT and LMR recession for 12 diopters. In PAT in primary no double vision. Gave information to read at home and discussed the risks and benefits of the surgery.     Macular hole of right eye  8/17/2022 - Macular hole OS discovered on OCT. Has seen Eddy in the past and with stable acuity was monitoring.     Pseudophakia of both eyes  8/17/2022 - IOL in place    Greater than 60 minute were spent examining the patient, reviewing records from referring providers including MRI images if available and records within the [facility] system, counselling the patient and family regarding the examination findings, diagnostic testing preformed, recommendations for management, prognosis, further testing and referrals as appropriate and follow up. This in addition to time spent interpreting separate billable tests done during the visit.      Eber Lopez M.D.

## 2022-08-25 ENCOUNTER — DOCUMENTATION (OUTPATIENT)
Dept: OPHTHALMOLOGY | Facility: MEDICAL CENTER | Age: 72
End: 2022-08-25
Payer: MEDICARE

## 2022-08-25 DIAGNOSIS — H49.9 OPHTHALMOPLEGIA: ICD-10-CM

## 2022-08-25 NOTE — PROGRESS NOTES
8/17/2022 - progressive difficulty controlling eyes together. By history initial problem as child with some systemic infection. 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 In primary is 12 ET and 10 RHT. I reviewed the most recent MRI from 2021 and there was no enhancing lesions involving CN 4, 3, or 6 th. Thus suspect breakdown in control over the past year with the IOOA. Dicussed that could consider a RIOAT and LMR recession for 12 diopters. In PAT in primary no double vision. Gave information to read at home and discussed the risks and benefits of the surgery.   8/25/2022 - Wishes to precede with strabismus repair. Will therefore plan on a RIOAT and LMR recession with adjustment for 12 diopters

## 2022-10-10 ENCOUNTER — PRE-ADMISSION TESTING (OUTPATIENT)
Dept: ADMISSIONS | Facility: MEDICAL CENTER | Age: 72
End: 2022-10-10
Attending: OPHTHALMOLOGY
Payer: MEDICARE

## 2022-10-10 RX ORDER — IBUPROFEN 200 MG
800 TABLET ORAL PRN
COMMUNITY

## 2022-10-10 RX ORDER — LOSARTAN POTASSIUM 100 MG/1
100 TABLET ORAL EVERY MORNING
COMMUNITY
Start: 2022-10-08

## 2022-10-10 RX ORDER — PANTOPRAZOLE SODIUM 40 MG/1
40 TABLET, DELAYED RELEASE ORAL EVERY MORNING
COMMUNITY
Start: 2022-08-18

## 2022-10-10 RX ORDER — TIZANIDINE 4 MG/1
TABLET ORAL
COMMUNITY
Start: 2022-09-12

## 2022-10-14 ENCOUNTER — APPOINTMENT (RX ONLY)
Dept: URBAN - NONMETROPOLITAN AREA CLINIC 1 | Facility: CLINIC | Age: 72
Setting detail: DERMATOLOGY
End: 2022-10-14

## 2022-10-14 DIAGNOSIS — Z41.9 ENCOUNTER FOR PROCEDURE FOR PURPOSES OTHER THAN REMEDYING HEALTH STATE, UNSPECIFIED: ICD-10-CM

## 2022-10-14 PROCEDURE — ? BOTOX

## 2022-10-14 NOTE — PROCEDURE: BOTOX
Nasal Root Units: 0
Show Additional Area 4: Yes
Comments: Pt asked to animate muscles in the treatment area and musculature was palpated for proper injection placement.
Additional Area 2 Units: 8
Additional Area 2 Location: upper lip
Consent: Written consent reviewed by RN and signed by pt. Risks include but not limited to lid/brow ptosis, bruising, swelling, diplopia, temporary effect, incomplete chemical denervation.   \\nPt's taking blood thinners are counseled on the increased risk of bleeding and bruising at the injection site.
Price (Use Numbers Only, No Special Characters Or $): 005
Post-Care Instructions: Patient instructed to not lie down for 4 hours and limit physical activity for 24 hours. RN recommends topical arnica and/or ice if bruising presents.\\n\\nPt instructed to contact the clinic with any questions, concerns, or post treatment complications.\\n\\Paty pt concerns and questions addressed and pt verbalized understanding.
Dilution (U/0.1 Cc): 4
Detail Level: Detailed
Forehead Units: 20
Lot #: [id number]
Glabellar Complex Units: 16

## 2022-10-19 RX ORDER — NEOMYCIN SULFATE, POLYMYXIN B SULFATE, AND DEXAMETHASONE 3.5; 10000; 1 MG/G; [USP'U]/G; MG/G
0.25 OINTMENT OPHTHALMIC 4 TIMES DAILY
Qty: 3.5 G | Refills: 2 | Status: SHIPPED | OUTPATIENT
Start: 2022-10-19

## 2022-10-21 ENCOUNTER — ANESTHESIA (OUTPATIENT)
Dept: SURGERY | Facility: MEDICAL CENTER | Age: 72
End: 2022-10-21
Payer: MEDICARE

## 2022-10-21 ENCOUNTER — HOSPITAL ENCOUNTER (OUTPATIENT)
Facility: MEDICAL CENTER | Age: 72
End: 2022-10-21
Attending: OPHTHALMOLOGY | Admitting: OPHTHALMOLOGY
Payer: MEDICARE

## 2022-10-21 ENCOUNTER — ANESTHESIA EVENT (OUTPATIENT)
Dept: SURGERY | Facility: MEDICAL CENTER | Age: 72
End: 2022-10-21
Payer: MEDICARE

## 2022-10-21 VITALS
WEIGHT: 103.62 LBS | DIASTOLIC BLOOD PRESSURE: 81 MMHG | HEART RATE: 94 BPM | OXYGEN SATURATION: 95 % | TEMPERATURE: 97.1 F | SYSTOLIC BLOOD PRESSURE: 134 MMHG | RESPIRATION RATE: 18 BRPM | BODY MASS INDEX: 19.07 KG/M2 | HEIGHT: 62 IN

## 2022-10-21 LAB
ANION GAP SERPL CALC-SCNC: 13 MMOL/L (ref 7–16)
BUN SERPL-MCNC: 14 MG/DL (ref 8–22)
CALCIUM SERPL-MCNC: 9.2 MG/DL (ref 8.5–10.5)
CHLORIDE SERPL-SCNC: 102 MMOL/L (ref 96–112)
CO2 SERPL-SCNC: 23 MMOL/L (ref 20–33)
CREAT SERPL-MCNC: 0.7 MG/DL (ref 0.5–1.4)
EKG IMPRESSION: NORMAL
GFR SERPLBLD CREATININE-BSD FMLA CKD-EPI: 91 ML/MIN/1.73 M 2
GLUCOSE SERPL-MCNC: 86 MG/DL (ref 65–99)
POTASSIUM SERPL-SCNC: 4 MMOL/L (ref 3.6–5.5)
SODIUM SERPL-SCNC: 138 MMOL/L (ref 135–145)

## 2022-10-21 PROCEDURE — 36415 COLL VENOUS BLD VENIPUNCTURE: CPT

## 2022-10-21 PROCEDURE — 160025 RECOVERY II MINUTES (STATS): Performed by: OPHTHALMOLOGY

## 2022-10-21 PROCEDURE — 67332 REREVISE EYE MUSCLES ADD-ON: CPT | Mod: 50 | Performed by: OPHTHALMOLOGY

## 2022-10-21 PROCEDURE — 160036 HCHG PACU - EA ADDL 30 MINS PHASE I: Performed by: OPHTHALMOLOGY

## 2022-10-21 PROCEDURE — 67314 REVISE EYE MUSCLE: CPT | Mod: RT | Performed by: OPHTHALMOLOGY

## 2022-10-21 PROCEDURE — 93005 ELECTROCARDIOGRAM TRACING: CPT | Performed by: OPHTHALMOLOGY

## 2022-10-21 PROCEDURE — 67311 REVISE EYE MUSCLE: CPT | Mod: LT | Performed by: OPHTHALMOLOGY

## 2022-10-21 PROCEDURE — 700101 HCHG RX REV CODE 250: Performed by: OPHTHALMOLOGY

## 2022-10-21 PROCEDURE — A9270 NON-COVERED ITEM OR SERVICE: HCPCS | Performed by: ANESTHESIOLOGY

## 2022-10-21 PROCEDURE — 67320 REVISE EYE MUSCLE(S) ADD-ON: CPT | Performed by: OPHTHALMOLOGY

## 2022-10-21 PROCEDURE — 160048 HCHG OR STATISTICAL LEVEL 1-5: Performed by: OPHTHALMOLOGY

## 2022-10-21 PROCEDURE — 93010 ELECTROCARDIOGRAM REPORT: CPT | Performed by: INTERNAL MEDICINE

## 2022-10-21 PROCEDURE — 160009 HCHG ANES TIME/MIN: Performed by: OPHTHALMOLOGY

## 2022-10-21 PROCEDURE — 80048 BASIC METABOLIC PNL TOTAL CA: CPT

## 2022-10-21 PROCEDURE — 700105 HCHG RX REV CODE 258: Performed by: OPHTHALMOLOGY

## 2022-10-21 PROCEDURE — 160035 HCHG PACU - 1ST 60 MINS PHASE I: Performed by: OPHTHALMOLOGY

## 2022-10-21 PROCEDURE — 160041 HCHG SURGERY MINUTES - EA ADDL 1 MIN LEVEL 4: Performed by: OPHTHALMOLOGY

## 2022-10-21 PROCEDURE — 700101 HCHG RX REV CODE 250: Performed by: ANESTHESIOLOGY

## 2022-10-21 PROCEDURE — 160029 HCHG SURGERY MINUTES - 1ST 30 MINS LEVEL 4: Performed by: OPHTHALMOLOGY

## 2022-10-21 PROCEDURE — 700102 HCHG RX REV CODE 250 W/ 637 OVERRIDE(OP): Performed by: ANESTHESIOLOGY

## 2022-10-21 PROCEDURE — 160046 HCHG PACU - 1ST 60 MINS PHASE II: Performed by: OPHTHALMOLOGY

## 2022-10-21 PROCEDURE — 160002 HCHG RECOVERY MINUTES (STAT): Performed by: OPHTHALMOLOGY

## 2022-10-21 PROCEDURE — 700111 HCHG RX REV CODE 636 W/ 250 OVERRIDE (IP): Performed by: ANESTHESIOLOGY

## 2022-10-21 PROCEDURE — 00140 ANES PROCEDURES ON EYE NOS: CPT | Performed by: ANESTHESIOLOGY

## 2022-10-21 PROCEDURE — 99100 ANES PT EXTEME AGE<1 YR&>70: CPT | Performed by: ANESTHESIOLOGY

## 2022-10-21 RX ORDER — DEXAMETHASONE SODIUM PHOSPHATE 4 MG/ML
INJECTION, SOLUTION INTRA-ARTICULAR; INTRALESIONAL; INTRAMUSCULAR; INTRAVENOUS; SOFT TISSUE PRN
Status: DISCONTINUED | OUTPATIENT
Start: 2022-10-21 | End: 2022-10-21 | Stop reason: SURG

## 2022-10-21 RX ORDER — ONDANSETRON 2 MG/ML
INJECTION INTRAMUSCULAR; INTRAVENOUS PRN
Status: DISCONTINUED | OUTPATIENT
Start: 2022-10-21 | End: 2022-10-21 | Stop reason: SURG

## 2022-10-21 RX ORDER — PHENYLEPHRINE HYDROCHLORIDE 25 MG/ML
SOLUTION/ DROPS OPHTHALMIC
Status: DISCONTINUED | OUTPATIENT
Start: 2022-10-21 | End: 2022-10-21 | Stop reason: HOSPADM

## 2022-10-21 RX ORDER — DIPHENHYDRAMINE HYDROCHLORIDE 50 MG/ML
12.5 INJECTION INTRAMUSCULAR; INTRAVENOUS
Status: DISCONTINUED | OUTPATIENT
Start: 2022-10-21 | End: 2022-10-21 | Stop reason: HOSPADM

## 2022-10-21 RX ORDER — LIDOCAINE HYDROCHLORIDE 20 MG/ML
INJECTION, SOLUTION EPIDURAL; INFILTRATION; INTRACAUDAL; PERINEURAL PRN
Status: DISCONTINUED | OUTPATIENT
Start: 2022-10-21 | End: 2022-10-21 | Stop reason: SURG

## 2022-10-21 RX ORDER — HALOPERIDOL 5 MG/ML
1 INJECTION INTRAMUSCULAR
Status: DISCONTINUED | OUTPATIENT
Start: 2022-10-21 | End: 2022-10-21 | Stop reason: HOSPADM

## 2022-10-21 RX ORDER — ONDANSETRON 2 MG/ML
4 INJECTION INTRAMUSCULAR; INTRAVENOUS
Status: DISCONTINUED | OUTPATIENT
Start: 2022-10-21 | End: 2022-10-21 | Stop reason: HOSPADM

## 2022-10-21 RX ORDER — TETRACAINE HYDROCHLORIDE 5 MG/ML
SOLUTION OPHTHALMIC
Status: DISCONTINUED | OUTPATIENT
Start: 2022-10-21 | End: 2022-10-21 | Stop reason: HOSPADM

## 2022-10-21 RX ORDER — TOBRAMYCIN AND DEXAMETHASONE 3; 1 MG/ML; MG/ML
SUSPENSION/ DROPS OPHTHALMIC
Status: DISCONTINUED | OUTPATIENT
Start: 2022-10-21 | End: 2022-10-21 | Stop reason: HOSPADM

## 2022-10-21 RX ORDER — SODIUM CHLORIDE, SODIUM LACTATE, POTASSIUM CHLORIDE, CALCIUM CHLORIDE 600; 310; 30; 20 MG/100ML; MG/100ML; MG/100ML; MG/100ML
INJECTION, SOLUTION INTRAVENOUS CONTINUOUS
Status: ACTIVE | OUTPATIENT
Start: 2022-10-21 | End: 2022-10-21

## 2022-10-21 RX ADMIN — EPHEDRINE SULFATE 10 MG: 50 INJECTION INTRAMUSCULAR; INTRAVENOUS; SUBCUTANEOUS at 10:07

## 2022-10-21 RX ADMIN — DEXAMETHASONE SODIUM PHOSPHATE 4 MG: 4 INJECTION, SOLUTION INTRA-ARTICULAR; INTRALESIONAL; INTRAMUSCULAR; INTRAVENOUS; SOFT TISSUE at 10:23

## 2022-10-21 RX ADMIN — HYDROCODONE BITARTRATE AND ACETAMINOPHEN 7.5 MG: 7.5; 325 SOLUTION ORAL at 12:45

## 2022-10-21 RX ADMIN — LIDOCAINE HYDROCHLORIDE 100 MG: 20 INJECTION, SOLUTION EPIDURAL; INFILTRATION; INTRACAUDAL at 09:45

## 2022-10-21 RX ADMIN — SODIUM CHLORIDE, POTASSIUM CHLORIDE, SODIUM LACTATE AND CALCIUM CHLORIDE: 600; 310; 30; 20 INJECTION, SOLUTION INTRAVENOUS at 09:38

## 2022-10-21 RX ADMIN — PROPOFOL 150 MG: 10 INJECTION, EMULSION INTRAVENOUS at 09:45

## 2022-10-21 RX ADMIN — EPHEDRINE SULFATE 10 MG: 50 INJECTION INTRAMUSCULAR; INTRAVENOUS; SUBCUTANEOUS at 10:05

## 2022-10-21 RX ADMIN — ONDANSETRON 4 MG: 2 INJECTION INTRAMUSCULAR; INTRAVENOUS at 10:30

## 2022-10-21 RX ADMIN — SODIUM CHLORIDE, POTASSIUM CHLORIDE, SODIUM LACTATE AND CALCIUM CHLORIDE: 600; 310; 30; 20 INJECTION, SOLUTION INTRAVENOUS at 10:35

## 2022-10-21 ASSESSMENT — PAIN DESCRIPTION - PAIN TYPE
TYPE: SURGICAL PAIN

## 2022-10-21 ASSESSMENT — FIBROSIS 4 INDEX: FIB4 SCORE: 2.45

## 2022-10-21 NOTE — OP REPORT
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     PROCEDURES:  Right inferior oblique anterior transposition.  Left medial   rectus muscle recession on adjustable suture.  The patient with prior ocular   surgery, thickening of the extraocular muscles.     DESCRIPTION OF PROCEDURE:  The patient was brought to the operating room and   under general anesthesia, was prepped and draped about both eyes in sterile   fashion.  Attention was first made to the right eye where a wire speculum was   placed and a 4-0 silk traction suture placed in the inferotemporal corneal   limbal junction.  Of note, the patient had prior cataract extraction and there   was some restriction into depression.  Therefore, in the inferior quadrant, a   conjunctival incision was made.  The right inferior oblique muscle was then   isolated using sequential muscle hooks.  Using a 5-0 Vicryl suture and   spatulated needle, this was placed in a weaving-type fashion through the   muscle near its insertion and locked at both ends.  The muscle was excised.    The right inferior rectus muscle was isolated using sequential muscle hooks   and the right inferior oblique muscle was transposed to a location 1 mm   anterior to the temporal border of the inferior rectus muscle.  This was done   using partial-thickness scleral bites, tied and found to be in the appropriate   position.  TobraDex ophthalmic solution was placed in the eye and the   conjunctiva reapproximated using a running 8-0 Vicryl suture.  Traction suture   and wire speculum was removed.  Attention was then made to the left eye where   a wire speculum was placed and a 4-0 silk traction suture placed at the   superior  inferior corneal limbal junction.  The eye was then abducted and   attention made to the region of the left medial rectus muscle, where   conjunctival peritomy was performed and extended into the superior inferior   quadrant.  Of note, the patient had prior cataract extraction and there was   very mild restriction to abduction.  The left medial rectus muscle was   isolated using sequential muscle hooks and using a 5-0 Vicryl suture and   spatulated needle, this was placed in a weaving-type fashion through the   muscle near its insertion and locked at both ends.  The muscle was excised.    Hemostasis was obtained with hot cautery.  The muscle was then reinserted to   the sclera at the original insertion, allowed to hang back 4.0 mm.  This was   tied in looped knots, Steri-Stripped to the skin to be adjusted in postop   recovery. TobraDex ophthalmic solution was placed in the eye and the   conjunctiva reapproximated using interrupted 8-0 Vicryl sutures there as well   as Steri-Stripped to the skin to be tied in postop recovery.  Traction suture   and wire speculum was removed.  Tetracaine as well as a pressure dressing was   placed over the eye.  The patient was then extubated and transported to   postoperative recovery for further adjustment.     In postop recovery when the patient was awake and alert, the patch was   removed.  The eye was slightly esotropic, so the left medial rectus muscle was   recessed an additional 2.0 mm for a total hang back recession of 6.0 mm.  At   this point, the eye was relatively orthotropic.  The conjunctiva was then   reapproximated using the preplaced 8-0 Vicryl sutures.  Tetracaine as well as   TobraDex ophthalmic ointment were placed in both eyes.  The patient was then   discharged.        ______________________________  Eber Lopez MD MBS/MARCO ANTONIO/LARISSA    DD:  10/21/2022 14:18  DT:  10/21/2022 14:56    Job#:  [id number]

## 2022-10-21 NOTE — OR NURSING
1041 Patient arrived from OR. ID verified report received. Attached to monitors. Patient sleep easy to arouse. 6L 02 mask to OPA. Left eye medley shield in place. Vital signs stable.   1123 updated friend Alphonse plan of care.   1144 Dr bailey at the bedside for left eye suture adjustment.  1221 Patient to phase 2 with all personal belongings.

## 2022-10-21 NOTE — ANESTHESIA PREPROCEDURE EVALUATION
Case: [id number] Date/Time: 10/21/22 0915    Procedure: LEFT EYE STRABISMUS 1 HORIZONTAL MUSCLE, SCARRING OF EXTRA OCCULAR MUSCLE, ADJUSTABLE SUTURE, RIGHT EYE STRABISMUS 1 VERTICLE MUSCLE, TRANSPOSITION PROCEDURE, ANY MUSCLE    Pre-op diagnosis: ESOTROPIA, MONOCULAR, HYPERTROPIA    Location: [facility] ROOM 24 / SURGERY SAME DAY [facility]    Surgeons: Eber Lopez M.D.          Relevant Problems   Other   (positive) Macular hole of right eye   (positive) Ophthalmoplegia   (positive) Pseudophakia of both eyes       Physical Exam    Airway   Mallampati: II  TM distance: >3 FB  Neck ROM: full       Cardiovascular - normal exam  Rhythm: regular  Rate: normal  (-) murmur     Dental - normal exam           Pulmonary - normal exam  Breath sounds clear to auscultation     Abdominal    Neurological - normal exam                 Anesthesia Plan    ASA 2       Plan - general       Airway plan will be LMA          Induction: intravenous      Pertinent diagnostic labs and testing reviewed    Informed Consent:    Anesthetic plan and risks discussed with patient.

## 2022-10-21 NOTE — ANESTHESIA PROCEDURE NOTES
Airway    Date/Time: 10/21/2022 9:45 AM  Performed by: Walter Ken M.D.  Authorized by: Walter Ken M.D.     Location:  OR  Urgency:  Elective  Indications for Airway Management:  Anesthesia      Spontaneous Ventilation: absent    Sedation Level:  Deep  Preoxygenated: Yes    Final Airway Type:  Supraglottic airway  Final Supraglottic Airway:  Standard LMA    SGA Size:  3  Number of Attempts at Approach:  1

## 2022-10-21 NOTE — OR NURSING
1215: Report received from BRANDON Colón.     1220: Pt arrived to phase II.     1240: Pt up to restroom; Pt voided and dressed.    1245: Pt reports pain 5/10. Pain medication given.    1245: Friend of pt, Alphonse brought to bedside.     1256: Discharge instructions provided to pt and friend. All questions answered. Prescription and eye ointment given to pt.     1306: PIV removed intact.     1307: Pt escorted out with all personal belongings via wheelchair by CNA.

## 2022-10-21 NOTE — OR SURGEON
Immediate Post OP Note    PreOp Diagnosis: right hypertropia / left esotropia      PostOp Diagnosis: right hypertropia / left esotropia      Procedure(s):  LEFT EYE STRABISMUS 1 HORIZONTAL MUSCLE, SCARRING OF EXTRA OCCULAR MUSCLE, ADJUSTABLE SUTURE, RIGHT EYE STRABISMUS 1 VERTICLE MUSCLE, TRANSPOSITION PROCEDURE, ANY MUSCLE - Wound Class: Clean    Surgeon(s):  Eber Lopez M.D.    Anesthesiologist/Type of Anesthesia:  Anesthesiologist: Walter Ken M.D./General    Surgical Staff:  Circulator: Juany Nava RYOVANY; Anita A Reyes, R.N.  Scrub Person: Marla Drummond; Yaw Elizalde    Specimens removed if any:  * No specimens in log *    Estimated Blood Loss: < 1cc    Findings: strabismus, prior cataract extraction\/ scarring     Complications: none        10/21/2022 10:43 AM Eber Lopez M.D.

## 2022-10-21 NOTE — DISCHARGE INSTRUCTIONS
If any questions arise, call your provider.  If your provider is not available, please feel free to call the Surgical Center at [phone]    Strabismus Surgery, Care After  This sheet gives you information about how to care for yourself after your procedure. Your health care provider may also give you more specific instructions. If you have problems or questions, contact your health care provider.  What can I expect after the procedure?  After the procedure, it is common to have:  Eye redness.  Eye soreness and scratchiness, especially with movement.  Double vision.  Follow these instructions at home:  Eye care    If directed, put a wet, cool washcloth or towel (cool compress) over your eye to help relieve soreness and scratchiness.  Do not rub your eye.  Follow instructions from your health care provider about caring for the incision in your eye. The stitches (absorbable sutures) in the eye will dissolve over time and do not need to be removed.  General instructions  Take over-the-counter and prescription medicines, including eye drops, only as told by your health care provider.  Do not drive until your health care provider approves.  Return to your normal activities as told by your health care provider. Ask your health care provider what activities are safe for you.  Do not swim or use a hot tub until your health care provider approves. This is usually about two weeks. You may shower as usual.  Keep all follow-up visits as told by your health care provider. This is important.  Contact a health care provider if you have:  A fever.  Redness, pain, swelling, or double vision that lasts for more than two weeks after the procedure.  Get help right away if you have:  Redness, pain, or swelling around the eye that gets worse.  Blood or pus coming from your eye.  A sudden change in vision.  Summary  After the procedure, it is common to have eye soreness and scratchiness, especially with movement.  If directed, put a  wet, cool washcloth or towel (cool compress) over your eye to help relieve discomfort.  Do not rub your eye.  This information is not intended to replace advice given to you by your health care provider. Make sure you discuss any questions you have with your health care provider.  Document Released: 01/08/2016 Document Revised: 12/31/2018 Document Reviewed: 12/31/2018  [employer] Patient Education © 2020 [employer]  .    MEDICATIONS: Resume taking daily medication.  Take prescribed pain medication with food.  If no medication is prescribed, you may take non-aspirin pain medication if needed.  PAIN MEDICATION CAN BE VERY CONSTIPATING.  Take a stool softener or laxative such as senokot, pericolace, or milk of magnesia if needed.    Last pain medication given at     What to Expect Post Anesthesia    Rest and take it easy for the first 24 hours.  A responsible adult is recommended to remain with you during that time.  It is normal to feel sleepy.  We encourage you to not do anything that requires balance, judgment or coordination.    FOR 24 HOURS DO NOT:  Drive, operate machinery or run household appliances.  Drink beer or alcoholic beverages.  Make important decisions or sign legal documents.    To avoid nausea, slowly advance diet as tolerated, avoiding spicy or greasy foods for the first day.  Add more substantial food to your diet according to your provider's instructions.  Babies can be fed formula or breast milk as soon as they are hungry.  INCREASE FLUIDS AND FIBER TO AVOID CONSTIPATION.    MILD FLU-LIKE SYMPTOMS ARE NORMAL.  YOU MAY EXPERIENCE GENERALIZED MUSCLE ACHES, THROAT IRRITATION, HEADACHE AND/OR SOME NAUSEA.

## 2022-10-21 NOTE — ANESTHESIA TIME REPORT
Anesthesia Start and Stop Event Times     Date Time Event    10/21/2022 0927 Ready for Procedure     0938 Anesthesia Start     1036 Anesthesia Stop        Responsible Staff  10/21/22    Name Role Begin End    Walter Ken M.D. Anesth 0938 1036        Overtime Reason:  no overtime (within assigned shift)    Comments:

## 2022-10-21 NOTE — ANESTHESIA POSTPROCEDURE EVALUATION
Patient: Angie Dover    Procedure Summary     Date: 10/21/22 Room / Location: [facility] ROOM 24 / SURGERY SAME DAY [facility]    Anesthesia Start: 0938 Anesthesia Stop: 1036    Procedure: LEFT EYE STRABISMUS 1 HORIZONTAL MUSCLE, SCARRING OF EXTRA OCCULAR MUSCLE, ADJUSTABLE SUTURE, RIGHT EYE STRABISMUS 1 VERTICLE MUSCLE, TRANSPOSITION PROCEDURE, ANY MUSCLE (Bilateral: Eye) Diagnosis: (ESOTROPIA, MONOCULAR, HYPERTROPIA)    Surgeons: Eber Lopez M.D. Responsible Provider: Walter Ken M.D.    Anesthesia Type: general ASA Status: 2          Final Anesthesia Type: general  Last vitals  BP   Blood Pressure : 134/81    Temp   36.2 °C (97.1 °F)    Pulse   96   Resp   18    SpO2   98 %      Anesthesia Post Evaluation    Patient location during evaluation: PACU  Patient participation: complete - patient participated  Level of consciousness: awake and alert    Airway patency: patent  Anesthetic complications: no  Cardiovascular status: hemodynamically stable  Respiratory status: acceptable  Hydration status: euvolemic    PONV: none          There were no known notable events for this encounter.     Nurse Pain Score: 5 (NPRS)

## 2022-10-27 ENCOUNTER — OFFICE VISIT (OUTPATIENT)
Dept: OPHTHALMOLOGY | Facility: MEDICAL CENTER | Age: 72
End: 2022-10-27
Payer: MEDICARE

## 2022-10-27 DIAGNOSIS — H49.9 OPHTHALMOPLEGIA: ICD-10-CM

## 2022-10-27 PROCEDURE — 99024 POSTOP FOLLOW-UP VISIT: CPT | Performed by: OPHTHALMOLOGY

## 2022-10-27 ASSESSMENT — CONF VISUAL FIELD
OD_NORMAL: 1
OS_INFERIOR_TEMPORAL_RESTRICTION: 0
OD_SUPERIOR_TEMPORAL_RESTRICTION: 0
OD_INFERIOR_TEMPORAL_RESTRICTION: 0
OS_SUPERIOR_TEMPORAL_RESTRICTION: 0
OS_INFERIOR_NASAL_RESTRICTION: 0
OS_SUPERIOR_NASAL_RESTRICTION: 0
OD_INFERIOR_NASAL_RESTRICTION: 0
OS_NORMAL: 1
OD_SUPERIOR_NASAL_RESTRICTION: 0

## 2022-10-27 ASSESSMENT — TONOMETRY
IOP_METHOD: I-CARE
OD_IOP_MMHG: 13
OS_IOP_MMHG: 12

## 2022-10-27 ASSESSMENT — SLIT LAMP EXAM - LIDS
COMMENTS: NORMAL
COMMENTS: NORMAL

## 2022-10-27 ASSESSMENT — VISUAL ACUITY
OS_SC: 20/30
METHOD: SNELLEN - LINEAR
OS_SC+: -1
OD_SC: 20/40

## 2022-10-27 ASSESSMENT — EXTERNAL EXAM - RIGHT EYE: OD_EXAM: NORMAL

## 2022-10-27 ASSESSMENT — EXTERNAL EXAM - LEFT EYE: OS_EXAM: NORMAL

## 2022-10-27 NOTE — ASSESSMENT & PLAN NOTE
8/17/2022 - progressive difficulty controlling eyes together. By history initial problem as child with some systemic infection. 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 In primary is 12 ET and 10 RHT. I reviewed the most recent MRI from 2021 and there was no enhancing lesions involving CN 4, 3, or 6 th. Thus suspect breakdown in control over the past year with the IOOA. Dicussed that could consider a RIOAT and LMR recession for 12 diopters. In PAT in primary no double vision. Gave information to read at home and discussed the risks and benefits of the surgery.   8/25/2022 - Wishes to precede with strabismus repair. Will therefore plan on a RIOAT and LMR recession with adjustment for 12 diopters  10/27/2022 -excellent alignment and healing well following a right inferior bleak anterior transposition and left medial rectus muscle with adjustment.  No double vision in primary position.  Taper Maxitrol

## 2022-10-27 NOTE — PROGRESS NOTES
"Peds/Neuro Ophthalmology:   Eber Lopez M.D.    Date & Time note created:    10/27/2022   5:34 PM     Referring MD / APRN:  Carlin Whitt M.D., No att. providers found    Patient ID:  Name:             Angie Dover   YOB: 1950  Age:                 72 y.o.  female   MRN:               [id number]    Chief Complaint/Reason for Visit:     Post Operative Strabismus Surgery (1 week follow up for both eyes. )      History of Present Illness:    Angie Dover is a 72 y.o. female    1 week post op for strabismus surgery for both eyes. Pt states vision has improved a lot. Does not see double at all. Pt is using Maxitrol QID OU. Pt has slight discomfort in both eyes but more the right eye feels achy and scratchy.       Review of Systems:  Review of Systems   Eyes:          1 week post op for strabismus surgery for both eyes.     Past Medical History:   Past Medical History:   Diagnosis Date    ADD (attention deficit disorder)     Anxiety     Arthritis     \"all over\"    Cataract     removed biat    Depression     GERD (gastroesophageal reflux disease)     Hypertension     Pain 10/10/2022    \"back\", 4/10    Urinary frequency        Past Surgical History:  Past Surgical History:   Procedure Laterality Date    STRABISMUS REPAIR Bilateral 10/21/2022    Procedure: LEFT EYE STRABISMUS 1 HORIZONTAL MUSCLE, SCARRING OF EXTRA OCCULAR MUSCLE, ADJUSTABLE SUTURE, RIGHT EYE STRABISMUS 1 VERTICLE MUSCLE, TRANSPOSITION PROCEDURE, ANY MUSCLE;  Surgeon: Eber Lopez M.D.;  Location: SURGERY SAME DAY [facility];  Service: Ophthalmology    BUNIONECTOMY Right 05/26/2020    Procedure: BUNIONECTOMY- AKIN OSTEOTOMY;  Surgeon: Gordon Diaz M.D.;  Location: SURGERY [facility];  Service: Orthopedics    OTHER ORTHOPEDIC SURGERY Right 2017    carpal tunnel release    CERVICAL DISK AND FUSION ANTERIOR      OTHER      isamar cataract    OTHER      lower face lift    OTHER NEUROLOGICAL SURG      back " surgery x 2       Current Outpatient Medications:  Current Outpatient Medications   Medication Sig Dispense Refill    neomycin-polymixin-dexamethasone (MAXITROL) 3.5-47647-1.1 Ointment ophthalmic ointment Apply 0.25 Inches to both eyes 4 times a day. 3.5 g 2    ibuprofen (MOTRIN) 200 MG Tab Take 800 mg by mouth as needed. Indications: Backache      tizanidine (ZANAFLEX) 4 MG Tab TAKE 1 TABLET BY MOUTH EVERY 8 HOURS AS NEEDED FOR SPASM.      pantoprazole (PROTONIX) 40 MG Tablet Delayed Response Take 40 mg by mouth every morning. Indications: Gastroesophageal Reflux Disease      losartan (COZAAR) 100 MG Tab Take 100 mg by mouth every morning. Indications: High Blood Pressure Disorder      lidocaine (LIDODERM) 5 % Patch Place 1 Patch on the skin every 24 hours. 10 Patch 0    ALPRAZolam (XANAX) 0.5 MG Tab Take 0.5 mg by mouth at bedtime. Indications: Feeling Anxious      Mirabegron ER (MYRBETRIQ) 50 MG TABLET SR 24 HR Take 1 Tablet by mouth every morning. Indications: Urinary Urgency      fluoxetine (PROZAC) 40 MG capsule Take 40 mg by mouth every morning. Indications: Generalized Anxiety Disorder, Major Depressive Disorder      trazodone (DESYREL) 100 MG Tab Take 100 mg by mouth every evening.      Lurasidone HCl 60 MG Tab Take 1 Tablet by mouth every morning. Indications: depression      methylphenidate (RITALIN) 20 MG tablet Take 20 mg by mouth 3 times a day. Indications: ADD       No current facility-administered medications for this visit.       Allergies:  Allergies   Allergen Reactions    Morphine Unspecified     Pt. States it gives her Migraine's     Penicillins Swelling     Pt. States swelling of the mouth     Sulfa Drugs Swelling     Pt. States mouth swelling        Family History:  Family History   Problem Relation Age of Onset    Glasses Mother        Social History:  Social History     Socioeconomic History    Marital status: Single     Spouse name: Not on file    Number of children: Not on file    Years of  education: Not on file    Highest education level: Not on file   Occupational History    Not on file   Tobacco Use    Smoking status: Former     Years: 15.00     Types: Cigarettes     Quit date: 2017     Years since quittin.8    Smokeless tobacco: Never   Vaping Use    Vaping Use: Never used   Substance and Sexual Activity    Alcohol use: Yes     Comment: reports 1 per day    Drug use: Not Currently    Sexual activity: Not on file   Other Topics Concern    Not on file   Social History Narrative    Retired book keeper     Social Determinants of Health     Financial Resource Strain: Not on file   Food Insecurity: Not on file   Transportation Needs: Not on file   Physical Activity: Not on file   Stress: Not on file   Social Connections: Not on file   Intimate Partner Violence: Not on file   Housing Stability: Not on file          Physical Exam:  Physical Exam    Oriented x 3  Weight/BMI: There is no height or weight on file to calculate BMI.  There were no vitals taken for this visit.    Base Eye Exam       Visual Acuity (Snellen - Linear)         Right Left    Dist sc 20/40 20/30 -1              Tonometry (i-care, 8:38 AM)         Right Left    Pressure 13 12              Pupils         Pupils    Right PERRL    Left PERRL              Visual Fields         Right Left     Full Full              Neuro/Psych       Oriented x3: Yes    Mood/Affect: Normal                  Slit Lamp and Fundus Exam       External Exam         Right Left    External Normal Normal              Slit Lamp Exam         Right Left    Lids/Lashes Normal Normal    Conjunctiva/Sclera 1+ Injection 1+ Injection    Cornea Clear Clear    Anterior Chamber Deep and quiet Deep and quiet    Iris Round and reactive Round and reactive    Lens Posterior chamber intraocular lens Posterior chamber intraocular lens    Vitreous Normal Normal              Fundus Exam         Right Left    Disc Normal Normal    Macula ERM Normal    Vessels Normal Normal     Periphery Normal Normal                    Pertinent Lab/Test/Imaging Review:      Assessment and Plan:     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 By history initial problem as child with some systemic infection. 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 In primary is 12 ET and 10 RHT. I reviewed the most recent MRI from 2021 and there was no enhancing lesions involving CN 4, 3, or 6 th. Thus suspect breakdown in control over the past year with the IOOA. Dicussed that could consider a RIOAT and LMR recession for 12 diopters. In PAT in primary no double vision. Gave information to read at home and discussed the risks and benefits of the surgery.   8/25/2022 - Wishes to precede with strabismus repair. Will therefore plan on a RIOAT and LMR recession with adjustment for 12 diopters  10/27/2022 -excellent alignment and healing well following a right inferior bleak anterior transposition and left medial rectus muscle with adjustment.  No double vision in primary position.  Reece Lopez M.D.

## 2022-12-14 ENCOUNTER — APPOINTMENT (RX ONLY)
Dept: URBAN - NONMETROPOLITAN AREA CLINIC 1 | Facility: CLINIC | Age: 72
Setting detail: DERMATOLOGY
End: 2022-12-14

## 2022-12-14 DIAGNOSIS — Z41.9 ENCOUNTER FOR PROCEDURE FOR PURPOSES OTHER THAN REMEDYING HEALTH STATE, UNSPECIFIED: ICD-10-CM

## 2022-12-14 PROCEDURE — ? ADDITIONAL NOTES

## 2022-12-14 PROCEDURE — ? BOTOX

## 2022-12-14 NOTE — PROCEDURE: BOTOX
Nasal Root Units: 0
Show Additional Area 4: Yes
Comments: Pt asked to animate muscles in the treatment area and musculature was palpated for proper injection placement.
Additional Area 2 Units: 8
Additional Area 2 Location: upper lip
Consent: Written consent reviewed by RN and signed by pt. Risks include but not limited to lid/brow ptosis, bruising, swelling, diplopia, temporary effect, incomplete chemical denervation.   \\nPt's taking blood thinners are counseled on the increased risk of bleeding and bruising at the injection site.
Price (Use Numbers Only, No Special Characters Or $): 431
Post-Care Instructions: Patient instructed to not lie down for 4 hours and limit physical activity for 24 hours. RN recommends topical arnica and/or ice if bruising presents.\\n\\nPt instructed to contact the clinic with any questions, concerns, or post treatment complications.\\n\\Paty pt concerns and questions addressed and pt verbalized understanding.\\n\\n10 units complimentary, product provided by [employer].
Dilution (U/0.1 Cc): 4
Detail Level: Detailed
Periorbital Skin Units: 10
Forehead Units: 20
Lot #: [id number]
Glabellar Complex Units: 16

## 2022-12-14 NOTE — PROCEDURE: ADDITIONAL NOTES
Additional Notes: Collagen Supplementation for Cosmetic Benefits\\n\\nOral collagen supplementation has been clinically studied for cosmetic benefits to the skin.  It has shown to be beneficial for wound healing, skin aging, increased skin elasticity, hydration, greater dermal collagen density, and a reduction in the degree of cellulite and reduced skin waviness without any reported adverse effects. \\n\\nBe aware of the purity of active ingredients and possibility of unlisted components when choosing a collagen supplement.  These supplements are derived from animal products, so may not be appropriate for certain dietary restrictions. \\n\\nVerisol and Gelita brands have been studied for cosmetic benefits and are considered biologically effective.  You may search for the brand by name which, may be found in both powder and pill forms.\\n\\nResults are visible after 4 to 6 weeks of daily use.  Take a selfie before you begin the supplement and then take another in 4-6 weeks for comparison and proof of affects.  \\n\\nIndustry-wide dosing has not been established so follow the product's recommended dosage guidelines.\\n\\DESMOND Kelley., ARMAND Ramos., Karen, TESS L. PETER., & Ora, N. A. (2019). Oral Collagen Supplementation: A Systematic Review of Dermatological Applications. Journal of Drugs in Dermatology, 18(1), 9–16.
Render Risk Assessment In Note?: no
Detail Level: Simple

## 2023-02-01 ENCOUNTER — OFFICE VISIT (OUTPATIENT)
Dept: OPHTHALMOLOGY | Facility: MEDICAL CENTER | Age: 73
End: 2023-02-01
Payer: MEDICARE

## 2023-02-01 DIAGNOSIS — H49.9 OPHTHALMOPLEGIA: ICD-10-CM

## 2023-02-01 DIAGNOSIS — H35.341 MACULAR HOLE OF RIGHT EYE: ICD-10-CM

## 2023-02-01 PROCEDURE — 99214 OFFICE O/P EST MOD 30 MIN: CPT | Mod: 25 | Performed by: OPHTHALMOLOGY

## 2023-02-01 PROCEDURE — 92250 FUNDUS PHOTOGRAPHY W/I&R: CPT | Performed by: OPHTHALMOLOGY

## 2023-02-01 ASSESSMENT — REFRACTION_MANIFEST
OD_AXIS: 014
OS_SPHERE: +0.25
OD_SPHERE: -0.25
OS_AXIS: 171
METHOD_AUTOREFRACTION: 1
OS_CYLINDER: +0.50
OD_CYLINDER: +1.75

## 2023-02-01 ASSESSMENT — TONOMETRY
OS_IOP_MMHG: 10
OD_IOP_MMHG: 10
IOP_METHOD: I-CARE

## 2023-02-01 ASSESSMENT — CONF VISUAL FIELD
OD_SUPERIOR_TEMPORAL_RESTRICTION: 0
OS_SUPERIOR_NASAL_RESTRICTION: 0
OD_NORMAL: 1
OD_INFERIOR_TEMPORAL_RESTRICTION: 0
OS_SUPERIOR_TEMPORAL_RESTRICTION: 0
OS_NORMAL: 1
OD_SUPERIOR_NASAL_RESTRICTION: 0
OS_INFERIOR_TEMPORAL_RESTRICTION: 0
OD_INFERIOR_NASAL_RESTRICTION: 0
OS_INFERIOR_NASAL_RESTRICTION: 0

## 2023-02-01 ASSESSMENT — EXTERNAL EXAM - RIGHT EYE: OD_EXAM: NORMAL

## 2023-02-01 ASSESSMENT — VISUAL ACUITY
OS_CC+: -1
METHOD: SNELLEN - LINEAR
CORRECTION_TYPE: GLASSES
OS_CC: 20/30
OD_CC: 20/40

## 2023-02-01 ASSESSMENT — REFRACTION_WEARINGRX
OS_CYLINDER: +0.75
OS_SPHERE: -0.50
SPECS_TYPE: BIFOCAL
OS_ADD: +2.50
OD_ADD: +2.50
OS_AXIS: 020
OD_SPHERE: PLANO

## 2023-02-01 ASSESSMENT — SLIT LAMP EXAM - LIDS
COMMENTS: NORMAL
COMMENTS: NORMAL

## 2023-02-01 ASSESSMENT — EXTERNAL EXAM - LEFT EYE: OS_EXAM: NORMAL

## 2023-02-01 ASSESSMENT — ENCOUNTER SYMPTOMS: BLURRED VISION: 1

## 2023-02-01 NOTE — ASSESSMENT & PLAN NOTE
8/17/2022 - progressive difficulty controlling eyes together. By history initial problem as child with some systemic infection. 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 In primary is 12 ET and 10 RHT. I reviewed the most recent MRI from 2021 and there was no enhancing lesions involving CN 4, 3, or 6 th. Thus suspect breakdown in control over the past year with the IOOA. Dicussed that could consider a RIOAT and LMR recession for 12 diopters. In PAT in primary no double vision. Gave information to read at home and discussed the risks and benefits of the surgery.   8/25/2022 - Wishes to precede with strabismus repair. Will therefore plan on a RIOAT and LMR recession with adjustment for 12 diopters  10/27/2022 -excellent alignment and healing well following a right inferior oblique anterior transposition and left medial rectus muscle with adjustment.  No double vision in primary position.  Taper Maxitrol  2/1/2023-overall doing well.  Some small intermittent yeast and hyperphoria however can fuse.  States that at times she is having more difficulty fusing especially when watching TV and I suspect this is secondary to her reduced acuity in the right eye secondary to the macular hole.

## 2023-02-01 NOTE — PROGRESS NOTES
"Peds/Neuro Ophthalmology:   Eber Lopez M.D.    Date & Time note created:    2/1/2023   1:29 PM     Referring MD / APRN:  Carlin Whitt M.D., No att. providers found    Patient ID:  Name:             Angie Dover   YOB: 1950  Age:                 72 y.o.  female   MRN:               [id number]    Chief Complaint/Reason for Visit:     Other (3 month follow up for Ophthalmoplegia.)      History of Present Illness:    Angie Dover is a 72 y.o. female   3 month follow up for Ophthalmoplegia. Pt states vision in the right eye is always blurry. She wears glasses but does not correct the blurriness in the right eye. Per her optometrist she has a retinal hole that is causing her blurry vision. Pt states both eyes are always dry. She uses OTC eye drops Refresh at least 4 times a day sometimes more if her dry is bad. Pt denies headaches.       Review of Systems:  Review of Systems   Eyes:  Positive for blurred vision.        Ophthalmoplegia  Dry eyes   All other systems reviewed and are negative.    Past Medical History:   Past Medical History:   Diagnosis Date    ADD (attention deficit disorder)     Anxiety     Arthritis     \"all over\"    Cataract     removed biat    Depression     GERD (gastroesophageal reflux disease)     Hypertension     Pain 10/10/2022    \"back\", 4/10    Urinary frequency        Past Surgical History:  Past Surgical History:   Procedure Laterality Date    STRABISMUS REPAIR Bilateral 10/21/2022    Procedure: LEFT EYE STRABISMUS 1 HORIZONTAL MUSCLE, SCARRING OF EXTRA OCCULAR MUSCLE, ADJUSTABLE SUTURE, RIGHT EYE STRABISMUS 1 VERTICLE MUSCLE, TRANSPOSITION PROCEDURE, ANY MUSCLE;  Surgeon: Eber Lopez M.D.;  Location: SURGERY SAME DAY [facility];  Service: Ophthalmology    BUNIONECTOMY Right 05/26/2020    Procedure: BUNIONECTOMY- AKIN OSTEOTOMY;  Surgeon: Gordon Diaz M.D.;  Location: SURGERY [facility];  Service: Orthopedics    OTHER ORTHOPEDIC SURGERY " Right 2017    carpal tunnel release    CERVICAL DISK AND FUSION ANTERIOR      OTHER      isamar cataract    OTHER      lower face lift    OTHER NEUROLOGICAL SURG      back surgery x 2       Current Outpatient Medications:  Current Outpatient Medications   Medication Sig Dispense Refill    neomycin-polymixin-dexamethasone (MAXITROL) 3.5-13220-2.1 Ointment ophthalmic ointment Apply 0.25 Inches to both eyes 4 times a day. 3.5 g 2    ibuprofen (MOTRIN) 200 MG Tab Take 800 mg by mouth as needed. Indications: Backache      tizanidine (ZANAFLEX) 4 MG Tab TAKE 1 TABLET BY MOUTH EVERY 8 HOURS AS NEEDED FOR SPASM.      pantoprazole (PROTONIX) 40 MG Tablet Delayed Response Take 40 mg by mouth every morning. Indications: Gastroesophageal Reflux Disease      losartan (COZAAR) 100 MG Tab Take 100 mg by mouth every morning. Indications: High Blood Pressure Disorder      lidocaine (LIDODERM) 5 % Patch Place 1 Patch on the skin every 24 hours. 10 Patch 0    ALPRAZolam (XANAX) 0.5 MG Tab Take 0.5 mg by mouth at bedtime. Indications: Feeling Anxious      Mirabegron ER (MYRBETRIQ) 50 MG TABLET SR 24 HR Take 1 Tablet by mouth every morning. Indications: Urinary Urgency      fluoxetine (PROZAC) 40 MG capsule Take 40 mg by mouth every morning. Indications: Generalized Anxiety Disorder, Major Depressive Disorder      trazodone (DESYREL) 100 MG Tab Take 100 mg by mouth every evening.      Lurasidone HCl 60 MG Tab Take 1 Tablet by mouth every morning. Indications: depression      methylphenidate (RITALIN) 20 MG tablet Take 20 mg by mouth 3 times a day. Indications: ADD       No current facility-administered medications for this visit.       Allergies:  Allergies   Allergen Reactions    Morphine Unspecified     Pt. States it gives her Migraine's     Penicillins Swelling     Pt. States swelling of the mouth     Sulfa Drugs Swelling     Pt. States mouth swelling        Family History:  Family History   Problem Relation Age of Onset    Glasses  Mother        Social History:  Social History     Socioeconomic History    Marital status: Single     Spouse name: Not on file    Number of children: Not on file    Years of education: Not on file    Highest education level: Not on file   Occupational History    Not on file   Tobacco Use    Smoking status: Former     Years: 15.00     Types: Cigarettes     Quit date: 2017     Years since quittin.0    Smokeless tobacco: Never   Vaping Use    Vaping Use: Never used   Substance and Sexual Activity    Alcohol use: Yes     Comment: reports 1 per day    Drug use: Not Currently    Sexual activity: Not on file   Other Topics Concern    Not on file   Social History Narrative    Retired book keeper     Social Determinants of Health     Financial Resource Strain: Not on file   Food Insecurity: Not on file   Transportation Needs: Not on file   Physical Activity: Not on file   Stress: Not on file   Social Connections: Not on file   Intimate Partner Violence: Not on file   Housing Stability: Not on file          Physical Exam:  Physical Exam    Oriented x 3  Weight/BMI: There is no height or weight on file to calculate BMI.  There were no vitals taken for this visit.    Base Eye Exam       Visual Acuity (Snellen - Linear)         Right Left    Dist cc 20/40 20/30 -1      Correction: Glasses              Tonometry (i-care, 10:43 AM)         Right Left    Pressure 10 10              Pupils         Pupils    Right PERRL    Left PERRL              Visual Fields         Right Left     Full Full              Neuro/Psych       Oriented x3: Yes    Mood/Affect: Normal                  Slit Lamp and Fundus Exam       External Exam         Right Left    External Normal Normal              Slit Lamp Exam         Right Left    Lids/Lashes Normal Normal    Conjunctiva/Sclera 1+ Injection 1+ Injection    Cornea Clear Clear    Anterior Chamber Deep and quiet Deep and quiet    Iris Round and reactive Round and reactive    Lens Posterior  chamber intraocular lens Posterior chamber intraocular lens    Vitreous Normal Normal              Fundus Exam         Right Left    Disc Normal Normal    Macula ERM Normal    Vessels Normal Normal    Periphery Normal Normal                  Refraction       Wearing Rx         Sphere Cylinder Axis Add    Right [place]   +2.50    Left -0.50 +0.75 020 +2.50      Age: 1w    Type: Bifocal              Manifest Refraction (Auto)         Sphere Cylinder Axis    Right -0.25 +1.75 014    Left +0.25 +0.50 171                    Pertinent Lab/Test/Imaging Review:      Assessment and Plan:     Macular hole of right eye  8/17/2022 - Macular hole OS discovered on OCT. Has seen Eddy in the past and with stable acuity was monitoring.   2/1/2023 -macular hole OD with visual acuity approximately 20/40.  Suspect that at times of her difficulty fusing the residual phoria this is secondary to her reduced acuity from the macular hole.  Recommended that she be reevaluated by Dr. Kam to determine whether she would be a surgical candidate    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 By history initial problem as child with some systemic infection. 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 In primary is 12 ET and 10 RHT. I reviewed the most recent MRI from 2021 and there was no enhancing lesions involving CN 4, 3, or 6 th. Thus suspect breakdown in control over the past year with the IOOA. Dicussed that could consider a RIOAT and LMR recession for 12 diopters. In PAT in primary no double vision. Gave information to read at home and discussed the risks and benefits of the surgery.   8/25/2022 - Wishes to precede with strabismus repair. Will therefore plan on a RIOAT and LMR recession with adjustment for 12 diopters  10/27/2022 -excellent alignment and healing well following a right inferior oblique  anterior transposition and left medial rectus muscle with adjustment.  No double vision in primary position.  Taper Maxitrol  2/1/2023-overall doing well.  Some small intermittent yeast and hyperphoria however can fuse.  States that at times she is having more difficulty fusing especially when watching TV and I suspect this is secondary to her reduced acuity in the right eye secondary to the macular hole.      Eber Lopez M.D.

## 2023-02-01 NOTE — ASSESSMENT & PLAN NOTE
8/17/2022 - Macular hole OS discovered on OCT. Has seen Eddy in the past and with stable acuity was monitoring.   2/1/2023 -macular hole OD with visual acuity approximately 20/40.  Suspect that at times of her difficulty fusing the residual phoria this is secondary to her reduced acuity from the macular hole.  Recommended that she be reevaluated by Dr. Kam to determine whether she would be a surgical candidate

## 2023-02-07 ENCOUNTER — APPOINTMENT (RX ONLY)
Dept: URBAN - NONMETROPOLITAN AREA CLINIC 1 | Facility: CLINIC | Age: 73
Setting detail: DERMATOLOGY
End: 2023-02-07

## 2023-02-07 DIAGNOSIS — Z41.9 ENCOUNTER FOR PROCEDURE FOR PURPOSES OTHER THAN REMEDYING HEALTH STATE, UNSPECIFIED: ICD-10-CM

## 2023-02-07 PROCEDURE — ? ADDITIONAL NOTES

## 2023-02-07 PROCEDURE — ? BOTOX

## 2023-02-07 NOTE — PROCEDURE: ADDITIONAL NOTES
Detail Level: Simple
Additional Notes: Collagen Supplementation for Cosmetic Benefits\\n\\nOral collagen supplementation has been clinically studied for cosmetic benefits to the skin.  It has shown to be beneficial for wound healing, skin aging, increased skin elasticity, hydration, greater dermal collagen density, and a reduction in the degree of cellulite and reduced skin waviness without any reported adverse effects. \\n\\nBe aware of the purity of active ingredients and possibility of unlisted components when choosing a collagen supplement.  These supplements are derived from animal products, so may not be appropriate for certain dietary restrictions. \\n\\nVerisol and Gelita brands have been studied for cosmetic benefits and are considered biologically effective.  You may search for the brand by name which, may be found in both powder and pill forms.\\n\\nResults are visible after 4 to 6 weeks of daily use.  Take a selfie before you begin the supplement and then take another in 4-6 weeks for comparison and proof of affects.  \\n\\nIndustry-wide dosing has not been established so follow the product's recommended dosage guidelines.\\n\\DESMOND Kelley., ARMAND Ramos., Karen, TESS L. PETER., & Ora, N. A. (2019). Oral Collagen Supplementation: A Systematic Review of Dermatological Applications. Journal of Drugs in Dermatology, 18(1), 9–16.
Render Risk Assessment In Note?: no

## 2023-02-07 NOTE — PROCEDURE: BOTOX
Nasal Root Units: 0
Show Additional Area 4: Yes
Comments: Pt asked to animate muscles in the treatment area and musculature was palpated for proper injection placement.
Additional Area 2 Units: 8
Additional Area 2 Location: upper lip
Consent: Written consent reviewed by RN and signed by pt. Risks include but not limited to lid/brow ptosis, bruising, swelling, diplopia, temporary effect, incomplete chemical denervation.   \\nPt's taking blood thinners are counseled on the increased risk of bleeding and bruising at the injection site.
Price (Use Numbers Only, No Special Characters Or $): 060
Post-Care Instructions: Patient instructed to not lie down for 4 hours and limit physical activity for 24 hours. RN recommends topical arnica and/or ice if bruising presents.\\n\\nPt instructed to contact the clinic with any questions, concerns, or post treatment complications.\\n\\Payt pt concerns and questions addressed and pt verbalized understanding.\\n\\n10 units complimentary, product provided by [employer].
Dilution (U/0.1 Cc): 4
Detail Level: Detailed
Forehead Units: 10
Lot #: [id number]

## 2023-04-13 ENCOUNTER — APPOINTMENT (RX ONLY)
Dept: URBAN - NONMETROPOLITAN AREA CLINIC 1 | Facility: CLINIC | Age: 73
Setting detail: DERMATOLOGY
End: 2023-04-13

## 2023-04-13 DIAGNOSIS — Z41.9 ENCOUNTER FOR PROCEDURE FOR PURPOSES OTHER THAN REMEDYING HEALTH STATE, UNSPECIFIED: ICD-10-CM

## 2023-04-13 PROCEDURE — ? BOTOX

## 2023-04-13 PROCEDURE — ? FILLERS

## 2023-04-13 NOTE — PROCEDURE: BOTOX
Show Levator Superior Units: Yes
Comments: Pt asked to animate muscles in the treatment area and musculature was palpated for proper injection placement.
Periorbital Skin Units: 12
Nasal Root Units: 0
Lot #: [id number]
Post-Care Instructions: Patient instructed to not lie down for 4 hours and limit physical activity for 24 hours. RN recommends topical arnica and/or ice if bruising presents.\\n\\nPt instructed to contact the clinic with any questions, concerns, or post treatment complications.\\n\\Paty pt concerns and questions addressed and pt verbalized understanding.\\n\\nComplimentary treatment, product provided by [employer].
Glabellar Complex Units: 10
Additional Area 1 Units: 8
Detail Level: Detailed
Dilution (U/0.1 Cc): 4
Consent: Written consent reviewed by RN and signed by pt. Risks include but not limited to lid/brow ptosis, bruising, swelling, diplopia, temporary effect, incomplete chemical denervation.   \\nPt's taking blood thinners are counseled on the increased risk of bleeding and bruising at the injection site.
Additional Area 1 Location: bunnies

## 2023-04-13 NOTE — PROCEDURE: FILLERS
Additional Area 5 Volume In Cc: 0
Include Cannula Information In Note?: No
Filler: Juvederm Vollure XC
Map Statment: See Attach Map for Complete Details
Detail Level: Detailed
Marionette Lines Filler Volume In Cc: 1
Topical Anesthesia?: 23% lidocaine, 7% tetracaine
Consent: Pt swished with antiseptic mouthwash for 30 seconds prior to treatment. \\n\\nWritten consent reviewed by RN and signed by pt. \\nRisks include but not limited to bruising, bleeding, irregular texture, ulceration, infection, allergic reaction, scar formation, incomplete augmentation, temporary nature, procedural pain, and vessel occlusion.  \\n\\Paty pt concerns and questions addressed, pt verbalized understanding.
Price (Use Numbers Only, No Special Characters Or $): 8981
Post-Care Instructions: Patient instructed to apply ice to reduce swelling. RN also recommends Arnica gel to reduce bruising. Pt instructed to contact the provider if complications arise.
Aspiration Statement: Aspiration was performed prior to injecting site with filler.

## 2023-04-20 ENCOUNTER — APPOINTMENT (RX ONLY)
Dept: URBAN - NONMETROPOLITAN AREA CLINIC 1 | Facility: CLINIC | Age: 73
Setting detail: DERMATOLOGY
End: 2023-04-20

## 2023-04-20 DIAGNOSIS — Z41.9 ENCOUNTER FOR PROCEDURE FOR PURPOSES OTHER THAN REMEDYING HEALTH STATE, UNSPECIFIED: ICD-10-CM

## 2023-04-20 PROCEDURE — ? COSMETIC FOLLOW-UP

## 2023-04-20 NOTE — PROCEDURE: COSMETIC FOLLOW-UP
Patient Satisfaction: Pleased
Global Improvement: Very Good
Detail Level: Zone
Treatment (Optional): Filler Injection
Price (Use Numbers Only, No Special Characters Or $): 0
Side Effects Override (Free Text): minimal bruising

## 2023-07-24 ENCOUNTER — APPOINTMENT (RX ONLY)
Dept: URBAN - NONMETROPOLITAN AREA CLINIC 1 | Facility: CLINIC | Age: 73
Setting detail: DERMATOLOGY
End: 2023-07-24

## 2023-07-24 DIAGNOSIS — Z41.9 ENCOUNTER FOR PROCEDURE FOR PURPOSES OTHER THAN REMEDYING HEALTH STATE, UNSPECIFIED: ICD-10-CM

## 2023-07-24 PROCEDURE — ? BOTOX

## 2023-07-24 NOTE — PROCEDURE: BOTOX
Additional Area 5 Units: 0
Show Depressor Anguli Units: Yes
Forehead Units: 24
Post-Care Instructions: Patient instructed to not lie down for 4 hours and limit physical activity for 24 hours. RN recommends topical arnica and/or ice if bruising presents.\\n\\nPt instructed to contact the clinic with any questions, concerns, or post treatment complications.\\n\\Paty pt concerns and questions addressed and pt verbalized understanding.\\n\\n\\n46 units complimentary, July [employer] Special.
Comments: Pt asked to animate muscles in the treatment area and musculature was palpated for proper injection placement.
Dilution (U/0.1 Cc): 4
Right Pupillary Line Units: 2
Additional Area 1 Units: 8
Consent: Written consent reviewed by RN and signed by pt. Risks include but not limited to lid/brow ptosis, bruising, swelling, diplopia, temporary effect, incomplete chemical denervation.   \\nPt's taking blood thinners are counseled on the increased risk of bleeding and bruising at the injection site.
Price (Use Numbers Only, No Special Characters Or $): 772
Additional Area 1 Location: upper lip
Lot #: [id number]
Detail Level: Detailed
Incrementing Botox Units: By 0.5 Units

## 2023-08-09 ENCOUNTER — OFFICE VISIT (OUTPATIENT)
Dept: OPHTHALMOLOGY | Facility: MEDICAL CENTER | Age: 73
End: 2023-08-09
Payer: MEDICARE

## 2023-08-09 DIAGNOSIS — H35.341 MACULAR HOLE OF RIGHT EYE: ICD-10-CM

## 2023-08-09 DIAGNOSIS — H04.129 DRY EYE: ICD-10-CM

## 2023-08-09 DIAGNOSIS — H49.9 OPHTHALMOPLEGIA: ICD-10-CM

## 2023-08-09 DIAGNOSIS — Z96.1 PSEUDOPHAKIA OF BOTH EYES: ICD-10-CM

## 2023-08-09 PROCEDURE — 92250 FUNDUS PHOTOGRAPHY W/I&R: CPT | Performed by: OPHTHALMOLOGY

## 2023-08-09 PROCEDURE — 99214 OFFICE O/P EST MOD 30 MIN: CPT | Mod: 25 | Performed by: OPHTHALMOLOGY

## 2023-08-09 PROCEDURE — 92060 SENSORIMOTOR EXAMINATION: CPT | Performed by: OPHTHALMOLOGY

## 2023-08-09 RX ORDER — VARENICLINE 0.03 MG/.05ML
SPRAY NASAL
COMMUNITY

## 2023-08-09 ASSESSMENT — VISUAL ACUITY
CORRECTION_TYPE: GLASSES
OD_PH_CC: 20/30-2
OD_CC: J1
OD_CC: 20/40-2
OS_CC: J1
METHOD: SNELLEN - LINEAR
OS_CC: 20/30-1

## 2023-08-09 ASSESSMENT — REFRACTION_MANIFEST
OD_SPHERE: -0.50
OS_SPHERE: +0.25
OD_CYLINDER: +1.25
OD_AXIS: 017
OS_CYLINDER: +0.50
OS_AXIS: 039
METHOD_AUTOREFRACTION: 1

## 2023-08-09 ASSESSMENT — REFRACTION_WEARINGRX
OD_ADD: +2.50
OD_SPHERE: PLANO
OS_ADD: +2.75
SPECS_TYPE: BIFOCAL
OS_SPHERE: -0.50
OS_AXIS: 015
OS_CYLINDER: +0.75
OD_CYLINDER: SPHERE

## 2023-08-09 ASSESSMENT — TONOMETRY
OS_IOP_MMHG: 9
OD_IOP_MMHG: 12

## 2023-08-09 ASSESSMENT — ENCOUNTER SYMPTOMS
BLURRED VISION: 1
DOUBLE VISION: 1

## 2023-08-09 ASSESSMENT — SLIT LAMP EXAM - LIDS
COMMENTS: NORMAL
COMMENTS: NORMAL

## 2023-08-09 ASSESSMENT — EXTERNAL EXAM - LEFT EYE: OS_EXAM: NORMAL

## 2023-08-09 ASSESSMENT — EXTERNAL EXAM - RIGHT EYE: OD_EXAM: NORMAL

## 2023-08-09 NOTE — ASSESSMENT & PLAN NOTE
8/17/2022 - progressive difficulty controlling eyes together. By history initial problem as child with some systemic infection. 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 In primary is 12 ET and 10 RHT. I reviewed the most recent MRI from 2021 and there was no enhancing lesions involving CN 4, 3, or 6 th. Thus suspect breakdown in control over the past year with the IOOA. Dicussed that could consider a RIOAT and LMR recession for 12 diopters. In PAT in primary no double vision. Gave information to read at home and discussed the risks and benefits of the surgery.   8/25/2022 - Wishes to precede with strabismus repair. Will therefore plan on a RIOAT and LMR recession with adjustment for 12 diopters  10/27/2022 -excellent alignment and healing well following a right inferior oblique anterior transposition and left medial rectus muscle with adjustment.  No double vision in primary position.  Taper Maxitrol  2/1/2023-overall doing well.  Some small intermittent ET and hyperphoria however can fuse.  States that at times she is having more difficulty fusing especially when watching TV and I suspect this is secondary to her reduced acuity in the right eye secondary to the macular hole.  8/9/2023-overall she she has done exceedingly well following right inferior bleak anterior transposition and left medial rectus muscle recession.  Still some slight double vision on extreme left horizontal gaze however not significantly bothersome.

## 2023-08-09 NOTE — ASSESSMENT & PLAN NOTE
8/9/2023-significant dry eye.  This despite the new nasal spray treatment and as well aggressive lubrication.  Recommended she discuss with [facility] placement of punctal plugs

## 2023-08-09 NOTE — PROGRESS NOTES
"Peds/Neuro Ophthalmology:   Eber Lopez M.D.    Date & Time note created:    8/9/2023   1:16 PM     Referring MD / APRN:  Carlin Whitt M.D., No att. providers found    Patient ID:  Name:             Angie Dover   YOB: 1950  Age:                 73 y.o.  female   MRN:               [id number]    Chief Complaint/Reason for Visit:     Other (opthalmoplegia)      History of Present Illness:    Angie Dover is a 73 y.o. female   Follow up ophthalmoplegia and double vision with past eye muscle surgery by .Vision blurred.Patient seeing Dr Kam for macular hole right eye.Occasional double vision when watching tv.        Review of Systems:  Review of Systems   Eyes:  Positive for blurred vision and double vision.   All other systems reviewed and are negative.      Past Medical History:   Past Medical History:   Diagnosis Date    ADD (attention deficit disorder)     Anxiety     Arthritis     \"all over\"    Cataract     removed biat    Depression     GERD (gastroesophageal reflux disease)     Hypertension     Pain 10/10/2022    \"back\", 4/10    Urinary frequency        Past Surgical History:  Past Surgical History:   Procedure Laterality Date    STRABISMUS REPAIR Bilateral 10/21/2022    Procedure: LEFT EYE STRABISMUS 1 HORIZONTAL MUSCLE, SCARRING OF EXTRA OCCULAR MUSCLE, ADJUSTABLE SUTURE, RIGHT EYE STRABISMUS 1 VERTICLE MUSCLE, TRANSPOSITION PROCEDURE, ANY MUSCLE;  Surgeon: Eber Lopez M.D.;  Location: SURGERY SAME DAY [facility];  Service: Ophthalmology    BUNIONECTOMY Right 05/26/2020    Procedure: BUNIONECTOMY- AKIN OSTEOTOMY;  Surgeon: Gordon Diaz M.D.;  Location: SURGERY [facility];  Service: Orthopedics    OTHER ORTHOPEDIC SURGERY Right 2017    carpal tunnel release    CERVICAL DISK AND FUSION ANTERIOR      OTHER      isamar cataract    OTHER      lower face lift    OTHER NEUROLOGICAL SURG      back surgery x 2       Current Outpatient " Medications:  Current Outpatient Medications   Medication Sig Dispense Refill    Varenicline Tartrate (TYRVAYA) 0.03 MG/ACT Solution Administer  into affected nostril(S).      tizanidine (ZANAFLEX) 4 MG Tab TAKE 1 TABLET BY MOUTH EVERY 8 HOURS AS NEEDED FOR SPASM.      pantoprazole (PROTONIX) 40 MG Tablet Delayed Response Take 40 mg by mouth every morning. Indications: Gastroesophageal Reflux Disease      losartan (COZAAR) 100 MG Tab Take 100 mg by mouth every morning. Indications: High Blood Pressure Disorder      lidocaine (LIDODERM) 5 % Patch Place 1 Patch on the skin every 24 hours. 10 Patch 0    ALPRAZolam (XANAX) 0.5 MG Tab Take 0.5 mg by mouth at bedtime. Indications: Feeling Anxious      fluoxetine (PROZAC) 40 MG capsule Take 40 mg by mouth every morning. Indications: Generalized Anxiety Disorder, Major Depressive Disorder      trazodone (DESYREL) 100 MG Tab Take 100 mg by mouth every evening.      Lurasidone HCl 60 MG Tab Take 1 Tablet by mouth every morning. Indications: depression      methylphenidate (RITALIN) 20 MG tablet Take 20 mg by mouth 3 times a day. Indications: ADD      neomycin-polymixin-dexamethasone (MAXITROL) 3.5-47008-6.1 Ointment ophthalmic ointment Apply 0.25 Inches to both eyes 4 times a day. (Patient not taking: Reported on 8/9/2023) 3.5 g 2    ibuprofen (MOTRIN) 200 MG Tab Take 800 mg by mouth as needed. Indications: Backache      Mirabegron ER (MYRBETRIQ) 50 MG TABLET SR 24 HR Take 1 Tablet by mouth every morning. Indications: Urinary Urgency (Patient not taking: Reported on 8/9/2023)       No current facility-administered medications for this visit.       Allergies:  Allergies   Allergen Reactions    Morphine Unspecified     Pt. States it gives her Migraine's     Penicillins Swelling     Pt. States swelling of the mouth     Sulfa Drugs Swelling     Pt. States mouth swelling        Family History:  Family History   Problem Relation Age of Onset    Glasses Mother        Social  History:  Social History     Socioeconomic History    Marital status: Single     Spouse name: Not on file    Number of children: Not on file    Years of education: Not on file    Highest education level: Not on file   Occupational History    Not on file   Tobacco Use    Smoking status: Former     Years: 15.00     Types: Cigarettes     Quit date: 2017     Years since quittin.6    Smokeless tobacco: Never   Vaping Use    Vaping Use: Never used   Substance and Sexual Activity    Alcohol use: Yes     Comment: reports 1 per day    Drug use: Not Currently    Sexual activity: Not on file   Other Topics Concern    Not on file   Social History Narrative    Retired book keeper     Social Determinants of Health     Financial Resource Strain: Not on file   Food Insecurity: Not on file   Transportation Needs: Not on file   Physical Activity: Not on file   Stress: Not on file   Social Connections: Not on file   Intimate Partner Violence: Not on file   Housing Stability: Not on file          Physical Exam:  Physical Exam    Oriented x 3  Weight/BMI: There is no height or weight on file to calculate BMI.  There were no vitals taken for this visit.    Base Eye Exam       Visual Acuity (Snellen - Linear)         Right Left    Dist cc 20/40-2 20/30-1    Dist ph cc 20/30-2 NI    Near cc J1 J1      Correction: Glasses              Tonometry (i care, 9:20 AM)         Right Left    Pressure 12 9              Pupils         Pupils    Right PERRL    Left PERRL              Neuro/Psych       Oriented x3: Yes    Mood/Affect: Normal                  Additional Tests       Color         Right Left    Ishihara 9/9 9/9              Stereo       Fly: +    Animals: 3/3    Circles: 6/9                  Strabismus Exam       Correction: sc      Distance Near Near +3DS Near Bifocals                      0 0 +4   0 0 0                       0  0  ET 12 0  -1            RHT 10           0 0 -2   0 0 0                       Slit Lamp and Fundus  Exam       External Exam         Right Left    External Normal Normal              Slit Lamp Exam         Right Left    Lids/Lashes Normal Normal    Conjunctiva/Sclera White and quiet White and quiet    Cornea Clear Clear    Anterior Chamber Deep and quiet Deep and quiet    Iris Round and reactive Round and reactive    Lens Posterior chamber intraocular lens Posterior chamber intraocular lens    Vitreous Normal Normal              Fundus Exam         Right Left    Disc Normal Normal    Macula ERM Normal    Vessels Normal Normal    Periphery Normal Normal                  Refraction       Wearing Rx         Sphere Cylinder Axis Add    Right [place] Sphere  +2.50    Left -0.50 +0.75 015 +2.75      Age: 1yr    Type: Bifocal              Manifest Refraction (Auto)         Sphere Cylinder Axis    Right -0.50 +1.25 017    Left +0.25 +0.50 039                    Pertinent Lab/Test/Imaging Review:      Assessment and Plan:     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 By history initial problem as child with some systemic infection. 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 In primary is 12 ET and 10 RHT. I reviewed the most recent MRI from 2021 and there was no enhancing lesions involving CN 4, 3, or 6 th. Thus suspect breakdown in control over the past year with the IOOA. Dicussed that could consider a RIOAT and LMR recession for 12 diopters. In PAT in primary no double vision. Gave information to read at home and discussed the risks and benefits of the surgery.   8/25/2022 - Wishes to precede with strabismus repair. Will therefore plan on a RIOAT and LMR recession with adjustment for 12 diopters  10/27/2022 -excellent alignment and healing well following a right inferior oblique anterior transposition and left medial rectus muscle with adjustment.  No double vision in primary position.  Taper  Maxitrol  2/1/2023-overall doing well.  Some small intermittent ET and hyperphoria however can fuse.  States that at times she is having more difficulty fusing especially when watching TV and I suspect this is secondary to her reduced acuity in the right eye secondary to the macular hole.  8/9/2023-overall she she has done exceedingly well following right inferior bleak anterior transposition and left medial rectus muscle recession.  Still some slight double vision on extreme left horizontal gaze however not significantly bothersome.    Macular hole of right eye  8/17/2022 - Macular hole OS discovered on OCT. Has seen Eddy in the past and with stable acuity was monitoring.   2/1/2023 -macular hole OD with visual acuity approximately 20/40.  Suspect that at times of her difficulty fusing the residual phoria this is secondary to her reduced acuity from the macular hole.  Recommended that she be reevaluated by Dr. Kam to determine whether she would be a surgical candidate  8/9/2023-overall stable.  Being monitored by Dr. Kam at [facility]    Dry eye  8/9/2023-significant dry eye.  This despite the new nasal spray treatment and as well aggressive lubrication.  Recommended she discuss with [facility] placement of punctal plugs    Pseudophakia of both eyes  8/17/2022 - IOL in place  8/9/2023-IOL intact        Eber Lopez M.D.

## 2023-08-09 NOTE — ASSESSMENT & PLAN NOTE
8/17/2022 - Macular hole OS discovered on OCT. Has seen Eddy in the past and with stable acuity was monitoring.   2/1/2023 -macular hole OD with visual acuity approximately 20/40.  Suspect that at times of her difficulty fusing the residual phoria this is secondary to her reduced acuity from the macular hole.  Recommended that she be reevaluated by Dr. Kam to determine whether she would be a surgical candidate  8/9/2023-overall stable.  Being monitored by Dr. Kam at [facility]

## 2023-09-22 ENCOUNTER — APPOINTMENT (RX ONLY)
Dept: URBAN - NONMETROPOLITAN AREA CLINIC 1 | Facility: CLINIC | Age: 73
Setting detail: DERMATOLOGY
End: 2023-09-22

## 2023-09-22 DIAGNOSIS — Z41.9 ENCOUNTER FOR PROCEDURE FOR PURPOSES OTHER THAN REMEDYING HEALTH STATE, UNSPECIFIED: ICD-10-CM

## 2023-09-22 PROCEDURE — ? BOTOX

## 2023-09-22 NOTE — PROCEDURE: BOTOX
Show Levator Superior Units: Yes
Comments: Pt asked to animate muscles in the treatment area and musculature was palpated for proper injection placement.
Periorbital Skin Units: 12
Nasal Root Units: 4
[facility] Units: 0
Lot #: [id number]
Price (Use Numbers Only, No Special Characters Or $): 334
Post-Care Instructions: Patient instructed to not lie down for 4 hours and limit physical activity for 24 hours. RN recommends topical arnica and/or ice if bruising presents.\\n\\nPt instructed to contact the clinic with any questions, concerns, or post treatment complications.\\n\\Paty pt concerns and questions addressed and pt verbalized understanding.\\n\\nComplimentary treatment, product provided by [employer].
Glabellar Complex Units: 10
Additional Area 1 Units: 8
Detail Level: Detailed
Consent: Written consent reviewed by RN and signed by pt. Risks include but not limited to lid/brow ptosis, bruising, swelling, diplopia, temporary effect, incomplete chemical denervation.   \\nPt's taking blood thinners are counseled on the increased risk of bleeding and bruising at the injection site.
Additional Area 1 Location: bunnies

## 2023-11-03 NOTE — PROCEDURE: CLEAR AND BRILLIANT LASER
Laser Type: Clear+Brilliant
Consent: Written consent reviewed by RN and signed by pt.  Risks reviewed including but not limited to redness, heat sensation, swelling, pigmentary changes, scabbing, crusting, blistering, and reactivation of cold sores. \\n\\nNo guarantees can be made and results vary from pt to pt.
Price (Use Numbers Only, No Special Characters Or $): 427
Length Of Topical Anesthesia Application (Optional): 20 minutes
Pre-Procedure Text: Pre treatment photos taken and topical numbing removed with a warm towel prior to procedure.\\n\\nAppropriate eye protection placed on pt and pt instructed to keep their eyes closed during the treatment.
Post-Procedure Care: 8 passes with C&B handpiece, additional pass over areas of concern at high level, and 2 passes, low level under eyes. Alastin Nectar and hydratint applied to treatment area.
Topical Anesthesia?: 23% lidocaine, 7% tetracaine
Energy Level: Medium
Detail Level: Zone
Post-Care Instructions: RN reviewed with the patient in detail post-care instructions.  Pt aware of post treatment pain, swelling, redness, and rough texture, which can last days.  Strict sun avoidance and protection emphasized.  If sun exposure is unavoidable, pt instructed to cover tx area with clothing. \\n\\Paty pt concerns and questions addressed, pt verbalized understanding.
Yes...

## 2023-11-13 ENCOUNTER — APPOINTMENT (RX ONLY)
Dept: URBAN - NONMETROPOLITAN AREA CLINIC 1 | Facility: CLINIC | Age: 73
Setting detail: DERMATOLOGY
End: 2023-11-13

## 2023-11-13 DIAGNOSIS — Z41.9 ENCOUNTER FOR PROCEDURE FOR PURPOSES OTHER THAN REMEDYING HEALTH STATE, UNSPECIFIED: ICD-10-CM

## 2023-11-13 PROCEDURE — ? BOTOX

## 2023-11-13 PROCEDURE — ? ADDITIONAL NOTES

## 2023-11-13 PROCEDURE — ? ALEXANDRITE LASER

## 2023-11-13 ASSESSMENT — LOCATION SIMPLE DESCRIPTION DERM
LOCATION SIMPLE: LEFT LIP
LOCATION SIMPLE: NOSE

## 2023-11-13 ASSESSMENT — LOCATION DETAILED DESCRIPTION DERM
LOCATION DETAILED: NASAL SUPRATIP
LOCATION DETAILED: LEFT LOWER CUTANEOUS LIP

## 2023-11-13 ASSESSMENT — LOCATION ZONE DERM
LOCATION ZONE: LIP
LOCATION ZONE: NOSE

## 2023-11-13 NOTE — PROCEDURE: ADDITIONAL NOTES
Render Risk Assessment In Note?: no
Detail Level: Simple
Additional Notes: Written copy of post care instructions provided to pt, reviewed by RN, and signed by pt.  See attached photo for complete instructions. \\n\\nPt instructed to protect from the sun with a broad spectrum, physical sunscreen and avoid direct sun exposure if possible.  Apply gentle moisturizer or laser balm to treated areas, especially at night for 4 to 7 days post tx.  Avoid extremes in heat, intense workouts, and application of makeup for 24 hours post tx.  Do not scrub, exfoliate, or pick at the skin during the healing process and avoid topical retinols until healed.\\n\\Paty pt questions addressed and pt verbalized understanding.

## 2023-11-13 NOTE — PROCEDURE: BOTOX
Show Levator Superior Units: Yes
Comments: Pt asked to animate muscles in the treatment area and musculature was palpated for proper injection placement.
Periorbital Skin Units: 12
Nasal Root Units: 4
[facility] Units: 0
Lot #: [id number]
Price (Use Numbers Only, No Special Characters Or $): 929
Post-Care Instructions: Patient instructed to not lie down for 4 hours and limit physical activity for 24 hours. RN recommends topical arnica and/or ice if bruising presents.\\n\\nPt instructed to contact the clinic with any questions, concerns, or post treatment complications.\\n\\Paty pt concerns and questions addressed and pt verbalized understanding.
Glabellar Complex Units: 10
Detail Level: Detailed
Consent: Written consent reviewed by RN and signed by pt. Risks include but not limited to lid/brow ptosis, bruising, swelling, diplopia, temporary effect, incomplete chemical denervation.   \\nPt's taking blood thinners are counseled on the increased risk of bleeding and bruising at the injection site.
Additional Area 1 Location: bunnies

## 2023-11-13 NOTE — PROCEDURE: ALEXANDRITE LASER
Post-Procedure Text: Test pulse performed to determine appropriate settings; 1-2 shades darkening of pigment as endpoint. \\n\\nTopical numbing, if used, was removed with warm towel prior to tx. \\n\\nAppropriate eye protection placed on pt and pt instructed to keep their eyes closed during the tx. \\n\\nPost tx Alastin nectar and silkshield spf applied to tx area.
External Cooling Fan Speed: 0
Detail Level: Detailed
Spot Size: 10 mm
Pulse Duration: 3 ms
Post-Care Instructions: Post care instructions reviewed in detail with pt. \\n\\nPt instructed to protect from the sun with a broad spectrum, physical sunscreen and avoid direct sun exposure if possible.  Apply gentle moisturizer or laser balm to treated areas, especially at night for 4 to 7 days post tx.  Avoid extremes in heat, intense workouts, and application of makeup for 24 hours post tx.  Do not scrub, exfoliate, or pick at the skin during the healing process and avoid topical retinols until healed.
Fluence: 13
Length Of Topical Anesthesia Application (Optional): 15 minutes
Laser Type: Alexandrite 755nm
Consent: Written consent verbally reviewed by RN, signed by pt and written copy offered to pt.  \\nRisks reviewed including but not limited to pain, crusting, scabbing, blistering, scarring, darker or lighter pigmentary change, incidental hair removal, bruising, edema, erythema, pustules and/or pimples, infection, and incomplete removal of vessels.\\n\\nNo guarantees can be made and results vary pt to pt.  Pt understands multiple txs may be necessary to achieve desired result.  \\n\\nPt understands the tx is cosmetic in nature and not covered by insurance.
Topical Anesthesia Type: 23% lidocaine, 7% tetracaine
Price (Use Numbers Only, No Special Characters Or $): 376

## 2023-12-08 ENCOUNTER — APPOINTMENT (RX ONLY)
Dept: URBAN - NONMETROPOLITAN AREA CLINIC 1 | Facility: CLINIC | Age: 73
Setting detail: DERMATOLOGY
End: 2023-12-08

## 2023-12-08 DIAGNOSIS — Z41.9 ENCOUNTER FOR PROCEDURE FOR PURPOSES OTHER THAN REMEDYING HEALTH STATE, UNSPECIFIED: ICD-10-CM

## 2023-12-08 PROCEDURE — ? COSMETIC FOLLOW-UP

## 2024-01-25 ENCOUNTER — APPOINTMENT (RX ONLY)
Dept: URBAN - NONMETROPOLITAN AREA CLINIC 1 | Facility: CLINIC | Age: 74
Setting detail: DERMATOLOGY
End: 2024-01-25

## 2024-01-25 DIAGNOSIS — Z41.9 ENCOUNTER FOR PROCEDURE FOR PURPOSES OTHER THAN REMEDYING HEALTH STATE, UNSPECIFIED: ICD-10-CM

## 2024-01-25 PROCEDURE — ? COSMETIC FOLLOW-UP

## 2024-01-25 NOTE — PROCEDURE: COSMETIC FOLLOW-UP
Global Improvement: Good
Patient Satisfaction: Pleased
Detail Level: Zone
Treatment Override (Free Text): 755 Alexandrite
Price (Use Numbers Only, No Special Characters Or $): 0

## 2024-02-08 ENCOUNTER — APPOINTMENT (RX ONLY)
Dept: URBAN - NONMETROPOLITAN AREA CLINIC 1 | Facility: CLINIC | Age: 74
Setting detail: DERMATOLOGY
End: 2024-02-08

## 2024-02-08 DIAGNOSIS — Z41.9 ENCOUNTER FOR PROCEDURE FOR PURPOSES OTHER THAN REMEDYING HEALTH STATE, UNSPECIFIED: ICD-10-CM

## 2024-02-08 PROCEDURE — ? BOTOX

## 2024-02-08 NOTE — PROCEDURE: BOTOX
Show Levator Superior Units: Yes
Comments: Pt asked to animate muscles in the treatment area and musculature was palpated for proper injection placement.
Periorbital Skin Units: 12
Nasal Root Units: 4
[facility] Units: 0
Lot #: [id number]
Price (Use Numbers Only, No Special Characters Or $): 878
Post-Care Instructions: Patient instructed to not lie down for 4 hours and limit physical activity for 24 hours. RN recommends topical arnica and/or ice if bruising presents.\\n\\nPt instructed to contact the clinic with any questions, concerns, or post treatment complications.\\n\\Paty pt concerns and questions addressed and pt verbalized understanding.
Glabellar Complex Units: 10
Detail Level: Detailed
Consent: Written consent reviewed by RN and signed by pt. Risks include but not limited to lid/brow ptosis, bruising, swelling, diplopia, temporary effect, incomplete chemical denervation.   \\nPt's taking blood thinners are counseled on the increased risk of bleeding and bruising at the injection site.
Additional Area 1 Location: bunnies

## 2024-05-02 ENCOUNTER — APPOINTMENT (RX ONLY)
Dept: URBAN - NONMETROPOLITAN AREA CLINIC 1 | Facility: CLINIC | Age: 74
Setting detail: DERMATOLOGY
End: 2024-05-02

## 2024-05-02 DIAGNOSIS — Z41.9 ENCOUNTER FOR PROCEDURE FOR PURPOSES OTHER THAN REMEDYING HEALTH STATE, UNSPECIFIED: ICD-10-CM

## 2024-05-02 PROCEDURE — ? BOTOX

## 2024-05-02 NOTE — PROCEDURE: BOTOX
Show [place] Units: Yes
Left Periorbital Skin Units: 0
Dilution (U/0.1 Cc): 4
Glabellar Complex Units: 10
Detail Level: Detailed
Lot #: [id number]
Price (Use Numbers Only, No Special Characters Or $): 072
Post-Care Instructions: Patient instructed to not lie down for 4 hours and limit physical activity for 24 hours. RN recommends topical arnica and/or ice if bruising presents.\\n\\nPt instructed to contact the clinic with any questions, concerns, or post treatment complications.\\n\\Paty pt concerns and questions addressed and pt verbalized understanding.
Consent: Written consent reviewed by RN and signed by pt. Risks include but not limited to lid/brow ptosis, bruising, swelling, diplopia, temporary effect, incomplete chemical denervation.   \\nPt's taking blood thinners are counseled on the increased risk of bleeding and bruising at the injection site.
Comments: Pt asked to animate muscles in the treatment area and musculature was palpated for proper injection placement.
Incrementing Botox Units: By 0.5 Units

## 2024-08-09 ENCOUNTER — APPOINTMENT (RX ONLY)
Dept: URBAN - NONMETROPOLITAN AREA CLINIC 1 | Facility: CLINIC | Age: 74
Setting detail: DERMATOLOGY
End: 2024-08-09

## 2024-08-09 DIAGNOSIS — Z41.9 ENCOUNTER FOR PROCEDURE FOR PURPOSES OTHER THAN REMEDYING HEALTH STATE, UNSPECIFIED: ICD-10-CM

## 2024-08-09 PROCEDURE — ? BOTOX

## 2024-08-09 NOTE — PROCEDURE: BOTOX
Show Masseter Units: Yes
Left Periorbital Skin Units: 0
Dilution (U/0.1 Cc): 4
Glabellar Complex Units: 10
Detail Level: Detailed
Additional Area 1 Units: 8
Lot #: [id number]
Price (Use Numbers Only, No Special Characters Or $): 321
Additional Area 1 Location: upper lip
Post-Care Instructions: Patient instructed to not lie down for 4 hours and limit physical activity for 24 hours. RN recommends topical arnica and/or ice if bruising presents.\\n\\nPt instructed to contact the clinic with any questions, concerns, or post treatment complications.\\n\\Paty pt concerns and questions addressed and pt verbalized understanding.
Consent: Written consent reviewed by RN and signed by pt. Risks include but not limited to lid/brow ptosis, bruising, swelling, diplopia, temporary effect, incomplete chemical denervation.   \\nPt's taking blood thinners are counseled on the increased risk of bleeding and bruising at the injection site.
Comments: Pt asked to animate muscles in the treatment area and musculature was palpated for proper injection placement.
Incrementing Botox Units: By 0.5 Units

## 2024-11-08 ENCOUNTER — APPOINTMENT (RX ONLY)
Dept: URBAN - NONMETROPOLITAN AREA CLINIC 1 | Facility: CLINIC | Age: 74
Setting detail: DERMATOLOGY
End: 2024-11-08

## 2024-11-08 DIAGNOSIS — Z41.9 ENCOUNTER FOR PROCEDURE FOR PURPOSES OTHER THAN REMEDYING HEALTH STATE, UNSPECIFIED: ICD-10-CM

## 2024-11-08 DIAGNOSIS — D485 NEOPLASM OF UNCERTAIN BEHAVIOR OF SKIN: ICD-10-CM

## 2024-11-08 DIAGNOSIS — L82.1 OTHER SEBORRHEIC KERATOSIS: ICD-10-CM

## 2024-11-08 PROBLEM — D48.5 NEOPLASM OF UNCERTAIN BEHAVIOR OF SKIN: Status: ACTIVE | Noted: 2024-11-08

## 2024-11-08 PROCEDURE — ? BOTOX

## 2024-11-08 PROCEDURE — ? COUNSELING

## 2024-11-08 PROCEDURE — ? BIOPSY BY SHAVE METHOD

## 2024-11-08 PROCEDURE — 69100 BIOPSY OF EXTERNAL EAR: CPT

## 2024-11-08 PROCEDURE — 99212 OFFICE O/P EST SF 10 MIN: CPT | Mod: 25

## 2024-11-08 ASSESSMENT — LOCATION DETAILED DESCRIPTION DERM
LOCATION DETAILED: LEFT PROXIMAL PRETIBIAL REGION
LOCATION DETAILED: LEFT INFERIOR CRUS OF ANTIHELIX
LOCATION DETAILED: RIGHT PROXIMAL PRETIBIAL REGION
LOCATION DETAILED: RIGHT PROXIMAL DORSAL FOREARM
LOCATION DETAILED: LEFT PROXIMAL DORSAL FOREARM

## 2024-11-08 ASSESSMENT — LOCATION SIMPLE DESCRIPTION DERM
LOCATION SIMPLE: LEFT FOREARM
LOCATION SIMPLE: RIGHT PRETIBIAL REGION
LOCATION SIMPLE: RIGHT FOREARM
LOCATION SIMPLE: LEFT EAR
LOCATION SIMPLE: LEFT PRETIBIAL REGION

## 2024-11-08 ASSESSMENT — LOCATION ZONE DERM
LOCATION ZONE: LEG
LOCATION ZONE: ARM
LOCATION ZONE: EAR

## 2024-11-08 NOTE — PROCEDURE: BIOPSY BY SHAVE METHOD
Detail Level: Detailed
Depth Of Biopsy: dermis
Was A Bandage Applied: Yes
Size Of Lesion In Cm: 0.2
Biopsy Type: H and E
Biopsy Method: Personna blade
Anesthesia Type: 1% lidocaine with epinephrine and a 1:10 solution of 8.4% sodium bicarbonate
Anesthesia Volume In Cc: 1
Additional Anesthesia Volume In Cc (Will Not Render If 0): 0
Hemostasis: Drysol
Wound Care: Vaseline
Dressing: Band-Aid
Destruction After The Procedure: No
Type Of Destruction Used: Electrodesiccation and Curettage
Curettage Text: The wound bed was treated with curettage after the biopsy was done.
Cryotherapy Text: The wound bed was treated with cryotherapy after the biopsy was performed.
Electrodesiccation Text: The wound bed was treated with electrodesiccation after the biopsy was performed.
Electrodesiccation And Curettage Text: The wound bed was treated with electrodesiccation and curettage after the biopsy was performed.
Silver Nitrate Text: The wound bed was treated with silver nitrate after the biopsy was performed.
Lab: [id number]
Consent: Written consent was obtained and risks were reviewed including but not limited to scarring, infection, bleeding, scabbing, incomplete removal, nerve damage and allergy to anesthesia.
Post-Care Instructions: I reviewed with the patient in detail post-care instructions. Patient is to keep the biopsy site dry overnight, and then apply Polysporin twice daily until healed. Patient may apply hydrogen peroxide soaks to remove any crusting.
Notification Instructions: Patient will be notified of biopsy results. However, patient instructed to call the office if not contacted within 2 weeks.
Billing Type: Third-Party Bill
Information: Selecting Yes will display possible errors in your note based on the variables you have selected. This validation is only offered as a suggestion for you. PLEASE NOTE THAT THE VALIDATION TEXT WILL BE REMOVED WHEN YOU FINALIZE YOUR NOTE. IF YOU WANT TO FAX A PRELIMINARY NOTE YOU WILL NEED TO TOGGLE THIS TO 'NO' IF YOU DO NOT WANT IT IN YOUR FAXED NOTE.

## 2024-11-08 NOTE — PROCEDURE: BOTOX
Show Levator Superior Units: Yes
Comments: Pt asked to animate muscles in the treatment area and musculature was palpated for proper injection placement.
Periorbital Skin Units: 12
Nasal Root Units: 4
[facility] Units: 0
Lot #: [id number]
Price (Use Numbers Only, No Special Characters Or $): 369
Post-Care Instructions: Patient instructed to not lie down for 4 hours and limit physical activity for 24 hours. RN recommends topical arnica and/or ice if bruising presents.\\n\\nPt instructed to contact the clinic with any questions, concerns, or post treatment complications.\\n\\Paty pt concerns and questions addressed and pt verbalized understanding.
Glabellar Complex Units: 10
Detail Level: Detailed
Consent: Written consent reviewed by RN and signed by pt. Risks include but not limited to lid/brow ptosis, bruising, swelling, diplopia, temporary effect, incomplete chemical denervation.   \\nPt's taking blood thinners are counseled on the increased risk of bleeding and bruising at the injection site.
Additional Area 1 Location: bunnies

## 2025-01-25 ENCOUNTER — HOSPITAL ENCOUNTER (OUTPATIENT)
Dept: RADIOLOGY | Facility: MEDICAL CENTER | Age: 75
End: 2025-01-25
Payer: MEDICARE

## 2025-02-04 ENCOUNTER — APPOINTMENT (OUTPATIENT)
Dept: URBAN - NONMETROPOLITAN AREA CLINIC 1 | Facility: CLINIC | Age: 75
Setting detail: DERMATOLOGY
End: 2025-02-04

## 2025-02-04 DIAGNOSIS — Z41.9 ENCOUNTER FOR PROCEDURE FOR PURPOSES OTHER THAN REMEDYING HEALTH STATE, UNSPECIFIED: ICD-10-CM

## 2025-02-04 PROCEDURE — ? BOTOX

## 2025-02-04 NOTE — PROCEDURE: BOTOX
Show Levator Superior Units: Yes
Comments: Pt asked to animate muscles in the treatment area and musculature was palpated for proper injection placement.
Periorbital Skin Units: 12
Nasal Root Units: 2
[facility] Units: 0
Lot #: [id number]
Price (Use Numbers Only, No Special Characters Or $): 074
Post-Care Instructions: Patient instructed to not lie down for 4 hours and limit physical activity for 24 hours. RN recommends topical arnica and/or ice if bruising presents.\\n\\nPt instructed to contact the clinic with any questions, concerns, or post treatment complications.\\n\\Paty pt concerns and questions addressed and pt verbalized understanding.
Glabellar Complex Units: 7
Additional Area 1 Units: 4
Detail Level: Detailed
Forehead Units: 10
Consent: Written consent reviewed by RN and signed by pt. Risks include but not limited to lid/brow ptosis, bruising, swelling, diplopia, temporary effect, incomplete chemical denervation.   \\nPt's taking blood thinners are counseled on the increased risk of bleeding and bruising at the injection site.
Additional Area 1 Location: upper lip

## (undated) DEVICE — SPONGE GAUZESTER 4 X 4 4PLY - (128PK/CA)

## (undated) DEVICE — BLANKET PEDIATRIC LARGE FULL ACCESS (10EA/CA)

## (undated) DEVICE — TUBING CLEARLINK DUO-VENT - C-FLO (48EA/CA)

## (undated) DEVICE — ELECTRODE 850 FOAM ADHESIVE - HYDROGEL RADIOTRNSPRNT (50/PK)

## (undated) DEVICE — MICRODRIP PRIMARY VENTED 60 (48EA/CA) THIS WAS PART #2C8428 WHICH WAS DISCONTINUED

## (undated) DEVICE — CANISTER SUCTION 3000ML MECHANICAL FILTER AUTO SHUTOFF MEDI-VAC NONSTERILE LF DISP  (40EA/CA)

## (undated) DEVICE — CANISTER SUCTION RIGID RED 1500CC (40EA/CA)

## (undated) DEVICE — DRAPE LARGE 3 QUARTER - (20/CA)

## (undated) DEVICE — SUTURE 3-0 MONOCRYL SH (36PK/BX)

## (undated) DEVICE — TRAY SKIN SCRUB PVP WET (20EA/CA) PART #DYND70356 DISCONTINUED

## (undated) DEVICE — Device

## (undated) DEVICE — MASK OXYGEN VNYL ADLT MED CONC WITH 7 FOOT TUBING  - (50EA/CA)

## (undated) DEVICE — HEAD HOLDER JUNIOR/ADULT

## (undated) DEVICE — GLOVE BIOGEL SZ 7 SURGICAL PF LTX - (50PR/BX 4BX/CA)

## (undated) DEVICE — GLOVE BIOGEL INDICATOR SZ 7SURGICAL PF LTX - (50/BX 4BX/CA)

## (undated) DEVICE — SUTURE 5-0 VICRYL D/A S-14 18 (12PK/BX)"

## (undated) DEVICE — BANDAGE STERILE 3 IN X 75 IN (12EA/BX 8BX/CA)

## (undated) DEVICE — MASK ANESTHESIA ADULT  - (100/CA)

## (undated) DEVICE — SUTURE GENERAL

## (undated) DEVICE — SYRINGE NON SAFETY 3 CC 21 GA X 1 1/2 IN (100/BX 8BX/CA)

## (undated) DEVICE — GLOVE BIOGEL PI INDICATOR SZ 8.0 SURGICAL PF LF -(50/BX 4BX/CA)

## (undated) DEVICE — GLOVE BIOGEL INDICATOR SZ 8.5 SURGICAL PF LTX - (50/BX 4BX/CA)

## (undated) DEVICE — GLOVE BIOGEL SZ 7.5 SURGICAL PF LTX - (50PR/BX 4BX/CA)

## (undated) DEVICE — PROTECTOR ULNA NERVE - (36PR/CA)

## (undated) DEVICE — GOWN WARMING STANDARD FLEX - (30/CA)

## (undated) DEVICE — GLOVE BIOGEL SZ 8 SURGICAL PF LTX - (50PR/BX 4BX/CA)

## (undated) DEVICE — SODIUM CHL IRRIGATION 0.9% 1000ML (12EA/CA)

## (undated) DEVICE — WATER IRRIGATION STERILE 1000ML (12EA/CA)

## (undated) DEVICE — SET LEADWIRE 5 LEAD BEDSIDE DISPOSABLE ECG (1SET OF 5/EA)

## (undated) DEVICE — TRANSDUCER OXISENSOR PEDS O2 - (20EA/BX)

## (undated) DEVICE — DRAPE STRLE REG TOWEL 18X24 - (10/BX 4BX/CA)"

## (undated) DEVICE — SUCTION INSTRUMENT YANKAUER BULBOUS TIP W/O VENT (50EA/CA)

## (undated) DEVICE — KIT ANESTHESIA W/CIRCUIT & 3/LT BAG W/FILTER (20EA/CA)

## (undated) DEVICE — SENSOR SPO2 NEO LNCS ADHESIVE (20/BX) SEE USER NOTES

## (undated) DEVICE — KIT  I.V. START (100EA/CA)

## (undated) DEVICE — SUTURE 3-0 ETHILON PS-1 (36PK/BX)

## (undated) DEVICE — DRAPE SM. APERTURE 16X16 IN - (10/BX)

## (undated) DEVICE — MASK AIRWAY FLEXIBLE SINGLE-USE SIZE 4 ADULTS (10EA/BX)

## (undated) DEVICE — SLEEVE VASO CALF MED - (10PR/CA)

## (undated) DEVICE — APPLICATOR COTTONTIP 3 IN - STERILE (10EA/PK 100PK/CA)

## (undated) DEVICE — SUTURE 8-0 COATED VICRYL TG160-8 (12PK/BX)

## (undated) DEVICE — SUTURE NABSB 4-0 P-5 18IN ACS BLK (12PK/BX)

## (undated) DEVICE — TOURNIQUET CUFF 24 X 4 ONE PORT - STERILE (10/BX)

## (undated) DEVICE — DRESSING 3X8 ADAPTIC GAUZE - NON-ADHERING (36/PK 6PK/BX)

## (undated) DEVICE — TOWEL STOP TIMEOUT SAFETY FLAG (40EA/CA)

## (undated) DEVICE — GLOVE SZ 7.5 BIOGEL PI MICRO - PF LF (50PR/BX)

## (undated) DEVICE — LACTATED RINGERS INJ 1000 ML - (14EA/CA 60CA/PF)

## (undated) DEVICE — MASK ANESTHESIA CHILD INFLATABLE CUSHION BUBBLEGUM (50EA/CS)

## (undated) DEVICE — PACK LOWER EXTREMITY - (2/CA)

## (undated) DEVICE — GLOVE SZ 8 BIOGEL PI MICRO - PF LF (50PR/BX)

## (undated) DEVICE — SET EXTENSION WITH 2 PORTS (48EA/CA) ***PART #2C8610 IS A SUBSTITUTE*****

## (undated) DEVICE — SUTURE 8-0 VICRYL TG140TG140 (12PK/BX)

## (undated) DEVICE — SENSOR OXIMETER ADULT SPO2 RD SET (20EA/BX)

## (undated) DEVICE — DRAPE LG. APERTURE 33 X 51" - (10EA/BX)"

## (undated) DEVICE — TUBE CONNECTING SUCTION - CLEAR PLASTIC STERILE 72 IN (50EA/CA)

## (undated) DEVICE — GOWN SURGEONS LARGE - (32/CA)

## (undated) DEVICE — CANNULA W/ SUPPLY TUBING O2 - (50/CA)